# Patient Record
Sex: MALE | Race: WHITE | NOT HISPANIC OR LATINO | URBAN - METROPOLITAN AREA
[De-identification: names, ages, dates, MRNs, and addresses within clinical notes are randomized per-mention and may not be internally consistent; named-entity substitution may affect disease eponyms.]

---

## 2018-01-15 PROBLEM — Z72.0 TOBACCO ABUSE: Status: ACTIVE | Noted: 2018-01-15

## 2021-01-28 ENCOUNTER — LAB VISIT (OUTPATIENT)
Dept: LAB | Facility: HOSPITAL | Age: 56
End: 2021-01-28
Attending: FAMILY MEDICINE
Payer: COMMERCIAL

## 2021-01-28 ENCOUNTER — HOSPITAL ENCOUNTER (OUTPATIENT)
Dept: RADIOLOGY | Facility: HOSPITAL | Age: 56
Discharge: HOME | End: 2021-01-28
Attending: FAMILY MEDICINE
Payer: COMMERCIAL

## 2021-01-28 ENCOUNTER — OFFICE VISIT (OUTPATIENT)
Dept: FAMILY MEDICINE | Facility: CLINIC | Age: 56
End: 2021-01-28
Payer: COMMERCIAL

## 2021-01-28 VITALS
SYSTOLIC BLOOD PRESSURE: 128 MMHG | HEIGHT: 68 IN | OXYGEN SATURATION: 96 % | DIASTOLIC BLOOD PRESSURE: 78 MMHG | HEART RATE: 82 BPM | TEMPERATURE: 98 F | WEIGHT: 157.94 LBS | BODY MASS INDEX: 23.94 KG/M2

## 2021-01-28 DIAGNOSIS — F10.10 ALCOHOL ABUSE: ICD-10-CM

## 2021-01-28 DIAGNOSIS — R41.0 DISORIENTATION: ICD-10-CM

## 2021-01-28 DIAGNOSIS — I10 ESSENTIAL HYPERTENSION: ICD-10-CM

## 2021-01-28 DIAGNOSIS — Z12.5 SCREENING PSA (PROSTATE SPECIFIC ANTIGEN): ICD-10-CM

## 2021-01-28 DIAGNOSIS — Z11.59 ENCOUNTER FOR HEPATITIS C SCREENING TEST FOR LOW RISK PATIENT: ICD-10-CM

## 2021-01-28 DIAGNOSIS — R41.0 DISORIENTATION: Primary | ICD-10-CM

## 2021-01-28 LAB
AMMONIA PLAS-SCNC: 38 UMOL/L (ref 10–50)
BASOPHILS # BLD AUTO: 0.08 K/UL (ref 0–0.2)
BASOPHILS NFR BLD: 0.9 % (ref 0–1.9)
BILIRUB UR QL STRIP: NEGATIVE
CLARITY UR REFRACT.AUTO: CLEAR
COLOR UR AUTO: YELLOW
DIFFERENTIAL METHOD: ABNORMAL
EOSINOPHIL # BLD AUTO: 0.1 K/UL (ref 0–0.5)
EOSINOPHIL NFR BLD: 0.8 % (ref 0–8)
ERYTHROCYTE [DISTWIDTH] IN BLOOD BY AUTOMATED COUNT: 13.6 % (ref 11.5–14.5)
ERYTHROCYTE [SEDIMENTATION RATE] IN BLOOD BY WESTERGREN METHOD: 11 MM/HR (ref 0–23)
GLUCOSE UR QL STRIP: NEGATIVE
HCT VFR BLD AUTO: 54 % (ref 40–54)
HGB BLD-MCNC: 18.3 G/DL (ref 14–18)
HGB UR QL STRIP: ABNORMAL
IMM GRANULOCYTES # BLD AUTO: 0.02 K/UL (ref 0–0.04)
IMM GRANULOCYTES NFR BLD AUTO: 0.2 % (ref 0–0.5)
KETONES UR QL STRIP: NEGATIVE
LEUKOCYTE ESTERASE UR QL STRIP: NEGATIVE
LYMPHOCYTES # BLD AUTO: 3 K/UL (ref 1–4.8)
LYMPHOCYTES NFR BLD: 35.1 % (ref 18–48)
MCH RBC QN AUTO: 32.9 PG (ref 27–31)
MCHC RBC AUTO-ENTMCNC: 33.9 G/DL (ref 32–36)
MCV RBC AUTO: 97 FL (ref 82–98)
MICROSCOPIC COMMENT: NORMAL
MONOCYTES # BLD AUTO: 0.8 K/UL (ref 0.3–1)
MONOCYTES NFR BLD: 9 % (ref 4–15)
NEUTROPHILS # BLD AUTO: 4.6 K/UL (ref 1.8–7.7)
NEUTROPHILS NFR BLD: 54 % (ref 38–73)
NITRITE UR QL STRIP: NEGATIVE
NRBC BLD-RTO: 0 /100 WBC
PH UR STRIP: 6 [PH] (ref 5–8)
PLATELET # BLD AUTO: 286 K/UL (ref 150–350)
PMV BLD AUTO: 10.9 FL (ref 9.2–12.9)
PROT UR QL STRIP: NEGATIVE
RBC # BLD AUTO: 5.56 M/UL (ref 4.6–6.2)
RBC #/AREA URNS AUTO: 0 /HPF (ref 0–4)
SP GR UR STRIP: 1 (ref 1–1.03)
SQUAMOUS #/AREA URNS AUTO: 0 /HPF
URN SPEC COLLECT METH UR: ABNORMAL
WBC # BLD AUTO: 8.52 K/UL (ref 3.9–12.7)
WBC #/AREA URNS AUTO: 0 /HPF (ref 0–5)

## 2021-01-28 PROCEDURE — 71046 XR CHEST PA AND LATERAL: ICD-10-PCS | Mod: 26,,, | Performed by: RADIOLOGY

## 2021-01-28 PROCEDURE — 99205 OFFICE O/P NEW HI 60 MIN: CPT | Mod: S$GLB,,, | Performed by: FAMILY MEDICINE

## 2021-01-28 PROCEDURE — 1126F PR PAIN SEVERITY QUANTIFIED, NO PAIN PRESENT: ICD-10-PCS | Mod: S$GLB,,, | Performed by: FAMILY MEDICINE

## 2021-01-28 PROCEDURE — 3008F PR BODY MASS INDEX (BMI) DOCUMENTED: ICD-10-PCS | Mod: CPTII,S$GLB,, | Performed by: FAMILY MEDICINE

## 2021-01-28 PROCEDURE — 84153 ASSAY OF PSA TOTAL: CPT

## 2021-01-28 PROCEDURE — 80307 DRUG TEST PRSMV CHEM ANLYZR: CPT

## 2021-01-28 PROCEDURE — 83036 HEMOGLOBIN GLYCOSYLATED A1C: CPT

## 2021-01-28 PROCEDURE — 1126F AMNT PAIN NOTED NONE PRSNT: CPT | Mod: S$GLB,,, | Performed by: FAMILY MEDICINE

## 2021-01-28 PROCEDURE — 71046 X-RAY EXAM CHEST 2 VIEWS: CPT | Mod: TC,FY,PO

## 2021-01-28 PROCEDURE — 86140 C-REACTIVE PROTEIN: CPT

## 2021-01-28 PROCEDURE — 3074F PR MOST RECENT SYSTOLIC BLOOD PRESSURE < 130 MM HG: ICD-10-PCS | Mod: CPTII,S$GLB,, | Performed by: FAMILY MEDICINE

## 2021-01-28 PROCEDURE — 3078F DIAST BP <80 MM HG: CPT | Mod: CPTII,S$GLB,, | Performed by: FAMILY MEDICINE

## 2021-01-28 PROCEDURE — 3008F BODY MASS INDEX DOCD: CPT | Mod: CPTII,S$GLB,, | Performed by: FAMILY MEDICINE

## 2021-01-28 PROCEDURE — 3078F PR MOST RECENT DIASTOLIC BLOOD PRESSURE < 80 MM HG: ICD-10-PCS | Mod: CPTII,S$GLB,, | Performed by: FAMILY MEDICINE

## 2021-01-28 PROCEDURE — 84425 ASSAY OF VITAMIN B-1: CPT

## 2021-01-28 PROCEDURE — 99999 PR PBB SHADOW E&M-NEW PATIENT-LVL IV: ICD-10-PCS | Mod: PBBFAC,,, | Performed by: FAMILY MEDICINE

## 2021-01-28 PROCEDURE — 71046 X-RAY EXAM CHEST 2 VIEWS: CPT | Mod: 26,,, | Performed by: RADIOLOGY

## 2021-01-28 PROCEDURE — 82607 VITAMIN B-12: CPT

## 2021-01-28 PROCEDURE — 82140 ASSAY OF AMMONIA: CPT

## 2021-01-28 PROCEDURE — 3074F SYST BP LT 130 MM HG: CPT | Mod: CPTII,S$GLB,, | Performed by: FAMILY MEDICINE

## 2021-01-28 PROCEDURE — 85652 RBC SED RATE AUTOMATED: CPT

## 2021-01-28 PROCEDURE — 81001 URINALYSIS AUTO W/SCOPE: CPT

## 2021-01-28 PROCEDURE — 36415 COLL VENOUS BLD VENIPUNCTURE: CPT | Mod: PO

## 2021-01-28 PROCEDURE — 99999 PR PBB SHADOW E&M-NEW PATIENT-LVL IV: CPT | Mod: PBBFAC,,, | Performed by: FAMILY MEDICINE

## 2021-01-28 PROCEDURE — 86803 HEPATITIS C AB TEST: CPT

## 2021-01-28 PROCEDURE — 82746 ASSAY OF FOLIC ACID SERUM: CPT

## 2021-01-28 PROCEDURE — 99205 PR OFFICE/OUTPT VISIT, NEW, LEVL V, 60-74 MIN: ICD-10-PCS | Mod: S$GLB,,, | Performed by: FAMILY MEDICINE

## 2021-01-28 PROCEDURE — 85025 COMPLETE CBC W/AUTO DIFF WBC: CPT

## 2021-01-28 PROCEDURE — 80061 LIPID PANEL: CPT

## 2021-01-28 PROCEDURE — 84443 ASSAY THYROID STIM HORMONE: CPT

## 2021-01-28 PROCEDURE — 80053 COMPREHEN METABOLIC PANEL: CPT

## 2021-01-28 RX ORDER — LANOLIN ALCOHOL/MO/W.PET/CERES
100 CREAM (GRAM) TOPICAL DAILY
Qty: 30 TABLET | Refills: 1 | Status: SHIPPED | OUTPATIENT
Start: 2021-01-28 | End: 2021-02-02

## 2021-01-29 ENCOUNTER — TELEPHONE (OUTPATIENT)
Dept: FAMILY MEDICINE | Facility: CLINIC | Age: 56
End: 2021-01-29

## 2021-01-29 ENCOUNTER — TELEPHONE (OUTPATIENT)
Dept: PAIN MEDICINE | Facility: CLINIC | Age: 56
End: 2021-01-29

## 2021-01-29 ENCOUNTER — HOSPITAL ENCOUNTER (OUTPATIENT)
Dept: RADIOLOGY | Facility: HOSPITAL | Age: 56
Discharge: HOME | End: 2021-01-29
Attending: FAMILY MEDICINE
Payer: COMMERCIAL

## 2021-01-29 DIAGNOSIS — R41.0 DISORIENTATION: ICD-10-CM

## 2021-01-29 LAB
ALBUMIN SERPL BCP-MCNC: 4 G/DL (ref 3.5–5.2)
ALP SERPL-CCNC: 66 U/L (ref 55–135)
ALT SERPL W/O P-5'-P-CCNC: 12 U/L (ref 10–44)
AMPHET+METHAMPHET UR QL: NEGATIVE
ANION GAP SERPL CALC-SCNC: 9 MMOL/L (ref 8–16)
AST SERPL-CCNC: 17 U/L (ref 10–40)
BARBITURATES UR QL SCN>200 NG/ML: NEGATIVE
BENZODIAZ UR QL SCN>200 NG/ML: NEGATIVE
BILIRUB SERPL-MCNC: 0.6 MG/DL (ref 0.1–1)
BUN SERPL-MCNC: 4 MG/DL (ref 6–20)
BZE UR QL SCN: NEGATIVE
CALCIUM SERPL-MCNC: 9.3 MG/DL (ref 8.7–10.5)
CANNABINOIDS UR QL SCN: NEGATIVE
CHLORIDE SERPL-SCNC: 100 MMOL/L (ref 95–110)
CHOLEST SERPL-MCNC: 140 MG/DL (ref 120–199)
CHOLEST/HDLC SERPL: 2.3 {RATIO} (ref 2–5)
CO2 SERPL-SCNC: 28 MMOL/L (ref 23–29)
COMPLEXED PSA SERPL-MCNC: 0.22 NG/ML (ref 0–4)
CREAT SERPL-MCNC: 1 MG/DL (ref 0.5–1.4)
CREAT UR-MCNC: 62 MG/DL (ref 23–375)
CRP SERPL-MCNC: 1 MG/L (ref 0–8.2)
EST. GFR  (AFRICAN AMERICAN): >60 ML/MIN/1.73 M^2
EST. GFR  (NON AFRICAN AMERICAN): >60 ML/MIN/1.73 M^2
ESTIMATED AVG GLUCOSE: 105 MG/DL (ref 68–131)
ETHANOL UR-MCNC: <10 MG/DL
FOLATE SERPL-MCNC: 3.6 NG/ML (ref 4–24)
GLUCOSE SERPL-MCNC: 79 MG/DL (ref 70–110)
HBA1C MFR BLD: 5.3 % (ref 4–5.6)
HCV AB SERPL QL IA: NEGATIVE
HDLC SERPL-MCNC: 60 MG/DL (ref 40–75)
HDLC SERPL: 42.9 % (ref 20–50)
LDLC SERPL CALC-MCNC: 52.6 MG/DL (ref 63–159)
METHADONE UR QL SCN>300 NG/ML: NEGATIVE
NONHDLC SERPL-MCNC: 80 MG/DL
OPIATES UR QL SCN: NEGATIVE
PCP UR QL SCN>25 NG/ML: NEGATIVE
POTASSIUM SERPL-SCNC: 3.7 MMOL/L (ref 3.5–5.1)
PROT SERPL-MCNC: 7.2 G/DL (ref 6–8.4)
SODIUM SERPL-SCNC: 137 MMOL/L (ref 136–145)
TOXICOLOGY INFORMATION: NORMAL
TRIGL SERPL-MCNC: 137 MG/DL (ref 30–150)
TSH SERPL DL<=0.005 MIU/L-ACNC: 1.18 UIU/ML (ref 0.4–4)
VIT B12 SERPL-MCNC: 202 PG/ML (ref 210–950)

## 2021-01-29 PROCEDURE — 70450 CT HEAD WITHOUT CONTRAST: ICD-10-PCS | Mod: 26,,, | Performed by: RADIOLOGY

## 2021-01-29 PROCEDURE — 70450 CT HEAD/BRAIN W/O DYE: CPT | Mod: TC,PO

## 2021-01-29 PROCEDURE — 70450 CT HEAD/BRAIN W/O DYE: CPT | Mod: 26,,, | Performed by: RADIOLOGY

## 2021-02-01 ENCOUNTER — TELEPHONE (OUTPATIENT)
Dept: FAMILY MEDICINE | Facility: CLINIC | Age: 56
End: 2021-02-01

## 2021-02-01 ENCOUNTER — OFFICE VISIT (OUTPATIENT)
Dept: FAMILY MEDICINE | Facility: CLINIC | Age: 56
End: 2021-02-01
Payer: COMMERCIAL

## 2021-02-01 VITALS
BODY MASS INDEX: 23.86 KG/M2 | SYSTOLIC BLOOD PRESSURE: 138 MMHG | DIASTOLIC BLOOD PRESSURE: 86 MMHG | TEMPERATURE: 97 F | OXYGEN SATURATION: 98 % | WEIGHT: 157.44 LBS | HEART RATE: 84 BPM | HEIGHT: 68 IN

## 2021-02-01 DIAGNOSIS — I63.89 ACUTE ARTERIAL ISCHEMIC STROKE, MULTIFOCAL, MULT VASCULAR TERRITORIES: ICD-10-CM

## 2021-02-01 DIAGNOSIS — I63.89 CEREBROVASCULAR ACCIDENT (CVA) DUE TO OTHER MECHANISM: ICD-10-CM

## 2021-02-01 DIAGNOSIS — R90.89 ABNORMAL CT OF BRAIN: Primary | ICD-10-CM

## 2021-02-01 DIAGNOSIS — F10.20 ALCOHOLIC: ICD-10-CM

## 2021-02-01 DIAGNOSIS — E53.8 FOLIC ACID DEFICIENCY: ICD-10-CM

## 2021-02-01 DIAGNOSIS — E53.8 VITAMIN B 12 DEFICIENCY: ICD-10-CM

## 2021-02-01 PROCEDURE — 99214 PR OFFICE/OUTPT VISIT, EST, LEVL IV, 30-39 MIN: ICD-10-PCS | Mod: 25,S$GLB,, | Performed by: FAMILY MEDICINE

## 2021-02-01 PROCEDURE — 3008F BODY MASS INDEX DOCD: CPT | Mod: CPTII,S$GLB,, | Performed by: FAMILY MEDICINE

## 2021-02-01 PROCEDURE — 1126F PR PAIN SEVERITY QUANTIFIED, NO PAIN PRESENT: ICD-10-PCS | Mod: S$GLB,,, | Performed by: FAMILY MEDICINE

## 2021-02-01 PROCEDURE — 3075F PR MOST RECENT SYSTOLIC BLOOD PRESS GE 130-139MM HG: ICD-10-PCS | Mod: CPTII,S$GLB,, | Performed by: FAMILY MEDICINE

## 2021-02-01 PROCEDURE — 96372 THER/PROPH/DIAG INJ SC/IM: CPT | Mod: S$GLB,,, | Performed by: FAMILY MEDICINE

## 2021-02-01 PROCEDURE — 3075F SYST BP GE 130 - 139MM HG: CPT | Mod: CPTII,S$GLB,, | Performed by: FAMILY MEDICINE

## 2021-02-01 PROCEDURE — 3079F PR MOST RECENT DIASTOLIC BLOOD PRESSURE 80-89 MM HG: ICD-10-PCS | Mod: CPTII,S$GLB,, | Performed by: FAMILY MEDICINE

## 2021-02-01 PROCEDURE — 96372 PR INJECTION,THERAP/PROPH/DIAG2ST, IM OR SUBCUT: ICD-10-PCS | Mod: S$GLB,,, | Performed by: FAMILY MEDICINE

## 2021-02-01 PROCEDURE — 3079F DIAST BP 80-89 MM HG: CPT | Mod: CPTII,S$GLB,, | Performed by: FAMILY MEDICINE

## 2021-02-01 PROCEDURE — 3008F PR BODY MASS INDEX (BMI) DOCUMENTED: ICD-10-PCS | Mod: CPTII,S$GLB,, | Performed by: FAMILY MEDICINE

## 2021-02-01 PROCEDURE — 99999 PR PBB SHADOW E&M-EST. PATIENT-LVL V: ICD-10-PCS | Mod: PBBFAC,,, | Performed by: FAMILY MEDICINE

## 2021-02-01 PROCEDURE — 99999 PR PBB SHADOW E&M-EST. PATIENT-LVL V: CPT | Mod: PBBFAC,,, | Performed by: FAMILY MEDICINE

## 2021-02-01 PROCEDURE — 1126F AMNT PAIN NOTED NONE PRSNT: CPT | Mod: S$GLB,,, | Performed by: FAMILY MEDICINE

## 2021-02-01 PROCEDURE — 99214 OFFICE O/P EST MOD 30 MIN: CPT | Mod: 25,S$GLB,, | Performed by: FAMILY MEDICINE

## 2021-02-01 RX ORDER — FOLIC ACID 1 MG/1
1 TABLET ORAL DAILY
Qty: 30 TABLET | Refills: 2 | Status: SHIPPED | OUTPATIENT
Start: 2021-02-01 | End: 2021-02-02

## 2021-02-01 RX ORDER — CYANOCOBALAMIN 1000 UG/ML
1000 INJECTION, SOLUTION INTRAMUSCULAR; SUBCUTANEOUS
Status: ACTIVE | OUTPATIENT
Start: 2021-02-01 | End: 2022-01-27

## 2021-02-01 RX ORDER — ASPIRIN 81 MG/1
81 TABLET ORAL DAILY
Qty: 30 TABLET | Refills: 3 | Status: SHIPPED | OUTPATIENT
Start: 2021-02-01 | End: 2023-09-01

## 2021-02-01 RX ADMIN — CYANOCOBALAMIN 1000 MCG: 1000 INJECTION, SOLUTION INTRAMUSCULAR; SUBCUTANEOUS at 11:02

## 2021-02-02 ENCOUNTER — TELEPHONE (OUTPATIENT)
Dept: SLEEP MEDICINE | Facility: HOSPITAL | Age: 56
End: 2021-02-02

## 2021-02-02 PROBLEM — F10.10 ALCOHOL ABUSE: Status: ACTIVE | Noted: 2021-02-02

## 2021-02-02 PROBLEM — I63.9 CVA (CEREBRAL VASCULAR ACCIDENT): Status: ACTIVE | Noted: 2021-02-02

## 2021-02-02 PROBLEM — F10.20 ALCOHOLISM: Status: ACTIVE | Noted: 2021-02-02

## 2021-02-02 PROBLEM — D75.1 POLYCYTHEMIA: Status: ACTIVE | Noted: 2021-02-02

## 2021-02-02 PROBLEM — I63.9 CEREBROVASCULAR ACCIDENT (CVA): Status: ACTIVE | Noted: 2021-02-02

## 2021-02-03 PROBLEM — E53.8 B12 DEFICIENCY: Status: ACTIVE | Noted: 2021-02-03

## 2021-02-03 PROBLEM — I63.511 ACUTE ISCHEMIC RIGHT MCA STROKE: Status: ACTIVE | Noted: 2021-02-02

## 2021-02-03 PROBLEM — E87.1 HYPONATREMIA: Status: ACTIVE | Noted: 2021-02-03

## 2021-02-05 LAB — VIT B1 BLD-MCNC: 55 UG/L (ref 38–122)

## 2021-03-05 ENCOUNTER — TELEPHONE (OUTPATIENT)
Dept: NEUROLOGY | Facility: CLINIC | Age: 56
End: 2021-03-05

## 2021-03-05 ENCOUNTER — OFFICE VISIT (OUTPATIENT)
Dept: NEUROLOGY | Facility: CLINIC | Age: 56
End: 2021-03-05
Payer: COMMERCIAL

## 2021-03-05 ENCOUNTER — PATIENT MESSAGE (OUTPATIENT)
Dept: NEUROLOGY | Facility: CLINIC | Age: 56
End: 2021-03-05

## 2021-03-05 VITALS
BODY MASS INDEX: 23.33 KG/M2 | SYSTOLIC BLOOD PRESSURE: 112 MMHG | RESPIRATION RATE: 16 BRPM | TEMPERATURE: 98 F | DIASTOLIC BLOOD PRESSURE: 63 MMHG | WEIGHT: 153.44 LBS | HEART RATE: 63 BPM

## 2021-03-05 DIAGNOSIS — E53.8 B12 DEFICIENCY: ICD-10-CM

## 2021-03-05 DIAGNOSIS — I10 ESSENTIAL HYPERTENSION: ICD-10-CM

## 2021-03-05 DIAGNOSIS — H53.40 VISUAL FIELD CUT: ICD-10-CM

## 2021-03-05 DIAGNOSIS — I63.511 ACUTE ISCHEMIC RIGHT MCA STROKE: ICD-10-CM

## 2021-03-05 DIAGNOSIS — I63.511 CEREBROVASCULAR ACCIDENT (CVA) DUE TO OCCLUSION OF RIGHT MIDDLE CEREBRAL ARTERY: Primary | ICD-10-CM

## 2021-03-05 DIAGNOSIS — F10.20 ALCOHOLISM: ICD-10-CM

## 2021-03-05 DIAGNOSIS — Z72.0 TOBACCO ABUSE: ICD-10-CM

## 2021-03-05 DIAGNOSIS — F32.A DEPRESSION, UNSPECIFIED DEPRESSION TYPE: ICD-10-CM

## 2021-03-05 DIAGNOSIS — D75.1 POLYCYTHEMIA: ICD-10-CM

## 2021-03-05 PROCEDURE — 3074F PR MOST RECENT SYSTOLIC BLOOD PRESSURE < 130 MM HG: ICD-10-PCS | Mod: CPTII,S$GLB,, | Performed by: NURSE PRACTITIONER

## 2021-03-05 PROCEDURE — 99417 PR PROLONGED SVC, OUTPT, W/WO DIRECT PT CONTACT,  EA ADDTL 15 MIN: ICD-10-PCS | Mod: S$GLB,,, | Performed by: NURSE PRACTITIONER

## 2021-03-05 PROCEDURE — 3008F PR BODY MASS INDEX (BMI) DOCUMENTED: ICD-10-PCS | Mod: CPTII,S$GLB,, | Performed by: NURSE PRACTITIONER

## 2021-03-05 PROCEDURE — 99999 PR PBB SHADOW E&M-EST. PATIENT-LVL V: ICD-10-PCS | Mod: PBBFAC,,, | Performed by: NURSE PRACTITIONER

## 2021-03-05 PROCEDURE — 3074F SYST BP LT 130 MM HG: CPT | Mod: CPTII,S$GLB,, | Performed by: NURSE PRACTITIONER

## 2021-03-05 PROCEDURE — 1126F PR PAIN SEVERITY QUANTIFIED, NO PAIN PRESENT: ICD-10-PCS | Mod: S$GLB,,, | Performed by: NURSE PRACTITIONER

## 2021-03-05 PROCEDURE — 99417 PROLNG OP E/M EACH 15 MIN: CPT | Mod: S$GLB,,, | Performed by: NURSE PRACTITIONER

## 2021-03-05 PROCEDURE — 3078F DIAST BP <80 MM HG: CPT | Mod: CPTII,S$GLB,, | Performed by: NURSE PRACTITIONER

## 2021-03-05 PROCEDURE — 3078F PR MOST RECENT DIASTOLIC BLOOD PRESSURE < 80 MM HG: ICD-10-PCS | Mod: CPTII,S$GLB,, | Performed by: NURSE PRACTITIONER

## 2021-03-05 PROCEDURE — 1126F AMNT PAIN NOTED NONE PRSNT: CPT | Mod: S$GLB,,, | Performed by: NURSE PRACTITIONER

## 2021-03-05 PROCEDURE — 99215 OFFICE O/P EST HI 40 MIN: CPT | Mod: S$GLB,,, | Performed by: NURSE PRACTITIONER

## 2021-03-05 PROCEDURE — 99215 PR OFFICE/OUTPT VISIT, EST, LEVL V, 40-54 MIN: ICD-10-PCS | Mod: S$GLB,,, | Performed by: NURSE PRACTITIONER

## 2021-03-05 PROCEDURE — 99999 PR PBB SHADOW E&M-EST. PATIENT-LVL V: CPT | Mod: PBBFAC,,, | Performed by: NURSE PRACTITIONER

## 2021-03-05 PROCEDURE — 3008F BODY MASS INDEX DOCD: CPT | Mod: CPTII,S$GLB,, | Performed by: NURSE PRACTITIONER

## 2021-03-09 ENCOUNTER — LAB VISIT (OUTPATIENT)
Dept: LAB | Facility: HOSPITAL | Age: 56
End: 2021-03-09
Attending: NURSE PRACTITIONER
Payer: COMMERCIAL

## 2021-03-09 DIAGNOSIS — I63.511 CEREBROVASCULAR ACCIDENT (CVA) DUE TO OCCLUSION OF RIGHT MIDDLE CEREBRAL ARTERY: ICD-10-CM

## 2021-03-09 PROCEDURE — 83695 ASSAY OF LIPOPROTEIN(A): CPT | Performed by: NURSE PRACTITIONER

## 2021-03-09 PROCEDURE — 83021 HEMOGLOBIN CHROMOTOGRAPHY: CPT | Performed by: NURSE PRACTITIONER

## 2021-03-09 PROCEDURE — 85613 RUSSELL VIPER VENOM DILUTED: CPT | Performed by: NURSE PRACTITIONER

## 2021-03-09 PROCEDURE — 86146 BETA-2 GLYCOPROTEIN ANTIBODY: CPT | Mod: 59 | Performed by: NURSE PRACTITIONER

## 2021-03-09 PROCEDURE — 85303 CLOT INHIBIT PROT C ACTIVITY: CPT | Performed by: NURSE PRACTITIONER

## 2021-03-09 PROCEDURE — 85240 CLOT FACTOR VIII AHG 1 STAGE: CPT | Performed by: NURSE PRACTITIONER

## 2021-03-09 PROCEDURE — 85300 ANTITHROMBIN III ACTIVITY: CPT | Performed by: NURSE PRACTITIONER

## 2021-03-09 PROCEDURE — 86147 CARDIOLIPIN ANTIBODY EA IG: CPT | Performed by: NURSE PRACTITIONER

## 2021-03-09 PROCEDURE — 83090 ASSAY OF HOMOCYSTEINE: CPT | Performed by: NURSE PRACTITIONER

## 2021-03-09 PROCEDURE — 36415 COLL VENOUS BLD VENIPUNCTURE: CPT | Mod: PO | Performed by: NURSE PRACTITIONER

## 2021-03-09 PROCEDURE — 86140 C-REACTIVE PROTEIN: CPT | Performed by: NURSE PRACTITIONER

## 2021-03-09 PROCEDURE — 85305 CLOT INHIBIT PROT S TOTAL: CPT | Performed by: NURSE PRACTITIONER

## 2021-03-10 ENCOUNTER — TELEPHONE (OUTPATIENT)
Dept: OPHTHALMOLOGY | Facility: CLINIC | Age: 56
End: 2021-03-10

## 2021-03-10 LAB
CRP SERPL-MCNC: 1.3 MG/L (ref 0–8.2)
FACT VIII ACT/NOR PPP: 76 % (ref 60–170)
HCYS SERPL-SCNC: 7.8 UMOL/L (ref 4–16.5)
HGB A2 MFR BLD HPLC: 2.8 % (ref 2.2–3.2)
HGB FRACT BLD ELPH-IMP: NORMAL
HGB FRACT BLD ELPH-IMP: NORMAL

## 2021-03-11 LAB
AT III ACT/NOR PPP CHRO: 103 % (ref 83–118)
LA PPP-IMP: NEGATIVE

## 2021-03-12 LAB
APTT PROTEIN C ACTIVATOR+FV DP/APTT PPP: 112 % (ref 70–140)
CARDIOLIPIN IGG SER IA-ACNC: <9.4 GPL (ref 0–14.99)
CARDIOLIPIN IGM SER IA-ACNC: <9.4 MPL (ref 0–12.49)
LPA SERPL-MCNC: 71 MG/DL (ref 0–30)
PROT S ACT/NOR PPP: 116 % (ref 70–140)

## 2021-03-13 LAB
B2 GLYCOPROT1 IGA SER QL: <9 SAU
B2 GLYCOPROT1 IGG SER QL: <9 SGU
B2 GLYCOPROT1 IGM SER QL: <9 SMU

## 2021-03-17 ENCOUNTER — TELEPHONE (OUTPATIENT)
Dept: NEUROLOGY | Facility: CLINIC | Age: 56
End: 2021-03-17

## 2021-03-18 PROBLEM — E78.2 MIXED HYPERLIPIDEMIA: Status: ACTIVE | Noted: 2021-03-18

## 2021-03-19 ENCOUNTER — TELEPHONE (OUTPATIENT)
Dept: NEUROLOGY | Facility: CLINIC | Age: 56
End: 2021-03-19

## 2021-03-19 ENCOUNTER — OFFICE VISIT (OUTPATIENT)
Dept: CARDIOLOGY | Facility: CLINIC | Age: 56
End: 2021-03-19
Payer: COMMERCIAL

## 2021-03-19 VITALS
HEART RATE: 62 BPM | BODY MASS INDEX: 22.96 KG/M2 | HEIGHT: 69 IN | DIASTOLIC BLOOD PRESSURE: 85 MMHG | SYSTOLIC BLOOD PRESSURE: 140 MMHG | WEIGHT: 155 LBS

## 2021-03-19 DIAGNOSIS — I63.511 ACUTE ISCHEMIC RIGHT MCA STROKE: ICD-10-CM

## 2021-03-19 DIAGNOSIS — I10 ESSENTIAL HYPERTENSION: Primary | ICD-10-CM

## 2021-03-19 DIAGNOSIS — I63.511 CEREBROVASCULAR ACCIDENT (CVA) DUE TO OCCLUSION OF RIGHT MIDDLE CEREBRAL ARTERY: ICD-10-CM

## 2021-03-19 DIAGNOSIS — E78.2 MIXED HYPERLIPIDEMIA: ICD-10-CM

## 2021-03-19 PROCEDURE — 99204 PR OFFICE/OUTPT VISIT, NEW, LEVL IV, 45-59 MIN: ICD-10-PCS | Mod: S$GLB,,, | Performed by: INTERNAL MEDICINE

## 2021-03-19 PROCEDURE — 99204 OFFICE O/P NEW MOD 45 MIN: CPT | Mod: S$GLB,,, | Performed by: INTERNAL MEDICINE

## 2021-03-19 PROCEDURE — 1126F PR PAIN SEVERITY QUANTIFIED, NO PAIN PRESENT: ICD-10-PCS | Mod: S$GLB,,, | Performed by: INTERNAL MEDICINE

## 2021-03-19 PROCEDURE — 3079F DIAST BP 80-89 MM HG: CPT | Mod: CPTII,S$GLB,, | Performed by: INTERNAL MEDICINE

## 2021-03-19 PROCEDURE — 99999 PR PBB SHADOW E&M-EST. PATIENT-LVL III: ICD-10-PCS | Mod: PBBFAC,,, | Performed by: INTERNAL MEDICINE

## 2021-03-19 PROCEDURE — 3008F PR BODY MASS INDEX (BMI) DOCUMENTED: ICD-10-PCS | Mod: CPTII,S$GLB,, | Performed by: INTERNAL MEDICINE

## 2021-03-19 PROCEDURE — 99999 PR PBB SHADOW E&M-EST. PATIENT-LVL III: CPT | Mod: PBBFAC,,, | Performed by: INTERNAL MEDICINE

## 2021-03-19 PROCEDURE — 3079F PR MOST RECENT DIASTOLIC BLOOD PRESSURE 80-89 MM HG: ICD-10-PCS | Mod: CPTII,S$GLB,, | Performed by: INTERNAL MEDICINE

## 2021-03-19 PROCEDURE — 3077F PR MOST RECENT SYSTOLIC BLOOD PRESSURE >= 140 MM HG: ICD-10-PCS | Mod: CPTII,S$GLB,, | Performed by: INTERNAL MEDICINE

## 2021-03-19 PROCEDURE — 1126F AMNT PAIN NOTED NONE PRSNT: CPT | Mod: S$GLB,,, | Performed by: INTERNAL MEDICINE

## 2021-03-19 PROCEDURE — 3077F SYST BP >= 140 MM HG: CPT | Mod: CPTII,S$GLB,, | Performed by: INTERNAL MEDICINE

## 2021-03-19 PROCEDURE — 3008F BODY MASS INDEX DOCD: CPT | Mod: CPTII,S$GLB,, | Performed by: INTERNAL MEDICINE

## 2021-03-19 RX ORDER — ATORVASTATIN CALCIUM 40 MG/1
40 TABLET, FILM COATED ORAL NIGHTLY
Qty: 90 TABLET | Refills: 3 | Status: SHIPPED | OUTPATIENT
Start: 2021-03-19 | End: 2021-08-19

## 2021-03-19 RX ORDER — HYDROXYZINE HYDROCHLORIDE 25 MG/1
25 TABLET, FILM COATED ORAL EVERY 4 HOURS PRN
COMMUNITY
End: 2021-05-11 | Stop reason: SDUPTHER

## 2021-03-24 ENCOUNTER — OFFICE VISIT (OUTPATIENT)
Dept: NEUROLOGY | Facility: CLINIC | Age: 56
End: 2021-03-24
Payer: COMMERCIAL

## 2021-03-24 VITALS
DIASTOLIC BLOOD PRESSURE: 64 MMHG | SYSTOLIC BLOOD PRESSURE: 110 MMHG | HEART RATE: 86 BPM | RESPIRATION RATE: 16 BRPM | BODY MASS INDEX: 22.35 KG/M2 | WEIGHT: 151.38 LBS | TEMPERATURE: 98 F

## 2021-03-24 DIAGNOSIS — E78.2 MIXED HYPERLIPIDEMIA: ICD-10-CM

## 2021-03-24 DIAGNOSIS — I63.89 OTHER CEREBRAL INFARCTION: ICD-10-CM

## 2021-03-24 DIAGNOSIS — F32.9 REACTIVE DEPRESSION: ICD-10-CM

## 2021-03-24 DIAGNOSIS — Z91.89 AT RISK FOR SEIZURES: Primary | ICD-10-CM

## 2021-03-24 DIAGNOSIS — I63.511 ACUTE ISCHEMIC RIGHT MCA STROKE: ICD-10-CM

## 2021-03-24 DIAGNOSIS — F10.20 ALCOHOLISM: ICD-10-CM

## 2021-03-24 DIAGNOSIS — I10 ESSENTIAL HYPERTENSION: ICD-10-CM

## 2021-03-24 PROBLEM — I63.9 CVA (CEREBRAL VASCULAR ACCIDENT): Status: RESOLVED | Noted: 2021-02-02 | Resolved: 2021-03-24

## 2021-03-24 PROCEDURE — 3008F PR BODY MASS INDEX (BMI) DOCUMENTED: ICD-10-PCS | Mod: CPTII,S$GLB,, | Performed by: NURSE PRACTITIONER

## 2021-03-24 PROCEDURE — 1126F PR PAIN SEVERITY QUANTIFIED, NO PAIN PRESENT: ICD-10-PCS | Mod: S$GLB,,, | Performed by: NURSE PRACTITIONER

## 2021-03-24 PROCEDURE — 3074F SYST BP LT 130 MM HG: CPT | Mod: CPTII,S$GLB,, | Performed by: NURSE PRACTITIONER

## 2021-03-24 PROCEDURE — 99215 OFFICE O/P EST HI 40 MIN: CPT | Mod: S$GLB,,, | Performed by: NURSE PRACTITIONER

## 2021-03-24 PROCEDURE — 3078F DIAST BP <80 MM HG: CPT | Mod: CPTII,S$GLB,, | Performed by: NURSE PRACTITIONER

## 2021-03-24 PROCEDURE — 99215 PR OFFICE/OUTPT VISIT, EST, LEVL V, 40-54 MIN: ICD-10-PCS | Mod: S$GLB,,, | Performed by: NURSE PRACTITIONER

## 2021-03-24 PROCEDURE — 3078F PR MOST RECENT DIASTOLIC BLOOD PRESSURE < 80 MM HG: ICD-10-PCS | Mod: CPTII,S$GLB,, | Performed by: NURSE PRACTITIONER

## 2021-03-24 PROCEDURE — 99999 PR PBB SHADOW E&M-EST. PATIENT-LVL V: CPT | Mod: PBBFAC,,, | Performed by: NURSE PRACTITIONER

## 2021-03-24 PROCEDURE — 3074F PR MOST RECENT SYSTOLIC BLOOD PRESSURE < 130 MM HG: ICD-10-PCS | Mod: CPTII,S$GLB,, | Performed by: NURSE PRACTITIONER

## 2021-03-24 PROCEDURE — 99999 PR PBB SHADOW E&M-EST. PATIENT-LVL V: ICD-10-PCS | Mod: PBBFAC,,, | Performed by: NURSE PRACTITIONER

## 2021-03-24 PROCEDURE — 1126F AMNT PAIN NOTED NONE PRSNT: CPT | Mod: S$GLB,,, | Performed by: NURSE PRACTITIONER

## 2021-03-24 PROCEDURE — 3008F BODY MASS INDEX DOCD: CPT | Mod: CPTII,S$GLB,, | Performed by: NURSE PRACTITIONER

## 2021-03-25 ENCOUNTER — TELEPHONE (OUTPATIENT)
Dept: NEUROLOGY | Facility: CLINIC | Age: 56
End: 2021-03-25

## 2021-03-25 DIAGNOSIS — I63.89 OTHER CEREBRAL INFARCTION: ICD-10-CM

## 2021-03-30 ENCOUNTER — LAB VISIT (OUTPATIENT)
Dept: LAB | Facility: HOSPITAL | Age: 56
End: 2021-03-30
Attending: NURSE PRACTITIONER
Payer: COMMERCIAL

## 2021-03-30 ENCOUNTER — TELEPHONE (OUTPATIENT)
Dept: NEUROLOGY | Facility: CLINIC | Age: 56
End: 2021-03-30

## 2021-03-30 DIAGNOSIS — I63.89 OTHER CEREBRAL INFARCTION: ICD-10-CM

## 2021-03-30 PROCEDURE — 85652 RBC SED RATE AUTOMATED: CPT | Performed by: NURSE PRACTITIONER

## 2021-03-30 PROCEDURE — 86140 C-REACTIVE PROTEIN: CPT | Performed by: NURSE PRACTITIONER

## 2021-03-30 PROCEDURE — 82565 ASSAY OF CREATININE: CPT | Performed by: NURSE PRACTITIONER

## 2021-03-30 PROCEDURE — 36415 COLL VENOUS BLD VENIPUNCTURE: CPT | Mod: PO | Performed by: NURSE PRACTITIONER

## 2021-03-31 LAB
CREAT SERPL-MCNC: 0.9 MG/DL (ref 0.5–1.4)
CRP SERPL-MCNC: 7.9 MG/L (ref 0–8.2)
ERYTHROCYTE [SEDIMENTATION RATE] IN BLOOD BY WESTERGREN METHOD: 11 MM/HR (ref 0–23)
EST. GFR  (AFRICAN AMERICAN): >60 ML/MIN/1.73 M^2
EST. GFR  (NON AFRICAN AMERICAN): >60 ML/MIN/1.73 M^2

## 2021-04-01 ENCOUNTER — TELEPHONE (OUTPATIENT)
Dept: NEUROLOGY | Facility: CLINIC | Age: 56
End: 2021-04-01

## 2021-04-01 ENCOUNTER — OFFICE VISIT (OUTPATIENT)
Dept: OPHTHALMOLOGY | Facility: CLINIC | Age: 56
End: 2021-04-01
Payer: COMMERCIAL

## 2021-04-01 ENCOUNTER — CLINICAL SUPPORT (OUTPATIENT)
Dept: OPHTHALMOLOGY | Facility: CLINIC | Age: 56
End: 2021-04-01
Payer: COMMERCIAL

## 2021-04-01 DIAGNOSIS — H53.40 VISUAL FIELD CUT: ICD-10-CM

## 2021-04-01 DIAGNOSIS — H53.461 HOMONYMOUS HEMIANOPIA, RIGHT: Primary | ICD-10-CM

## 2021-04-01 PROCEDURE — 92004 PR EYE EXAM, NEW PATIENT,COMPREHESV: ICD-10-PCS | Mod: S$GLB,,, | Performed by: OPHTHALMOLOGY

## 2021-04-01 PROCEDURE — 1126F AMNT PAIN NOTED NONE PRSNT: CPT | Mod: S$GLB,,, | Performed by: OPHTHALMOLOGY

## 2021-04-01 PROCEDURE — 99999 PR PBB SHADOW E&M-EST. PATIENT-LVL III: ICD-10-PCS | Mod: PBBFAC,,, | Performed by: OPHTHALMOLOGY

## 2021-04-01 PROCEDURE — 1126F PR PAIN SEVERITY QUANTIFIED, NO PAIN PRESENT: ICD-10-PCS | Mod: S$GLB,,, | Performed by: OPHTHALMOLOGY

## 2021-04-01 PROCEDURE — 92083 HUMPHREY VISUAL FIELD - OU - BOTH EYES: ICD-10-PCS | Mod: S$GLB,,, | Performed by: OPHTHALMOLOGY

## 2021-04-01 PROCEDURE — 92083 EXTENDED VISUAL FIELD XM: CPT | Mod: S$GLB,,, | Performed by: OPHTHALMOLOGY

## 2021-04-01 PROCEDURE — 92004 COMPRE OPH EXAM NEW PT 1/>: CPT | Mod: S$GLB,,, | Performed by: OPHTHALMOLOGY

## 2021-04-01 PROCEDURE — 99999 PR PBB SHADOW E&M-EST. PATIENT-LVL III: CPT | Mod: PBBFAC,,, | Performed by: OPHTHALMOLOGY

## 2021-04-05 ENCOUNTER — TELEPHONE (OUTPATIENT)
Dept: NEUROLOGY | Facility: CLINIC | Age: 56
End: 2021-04-05

## 2021-04-07 ENCOUNTER — TELEPHONE (OUTPATIENT)
Dept: NEUROLOGY | Facility: CLINIC | Age: 56
End: 2021-04-07

## 2021-04-14 ENCOUNTER — TELEPHONE (OUTPATIENT)
Dept: NEUROLOGY | Facility: CLINIC | Age: 56
End: 2021-04-14

## 2021-04-15 ENCOUNTER — TELEPHONE (OUTPATIENT)
Dept: NEUROLOGY | Facility: CLINIC | Age: 56
End: 2021-04-15

## 2021-05-06 ENCOUNTER — PATIENT MESSAGE (OUTPATIENT)
Dept: RESEARCH | Facility: HOSPITAL | Age: 56
End: 2021-05-06

## 2021-05-27 ENCOUNTER — TELEPHONE (OUTPATIENT)
Dept: PSYCHIATRY | Facility: CLINIC | Age: 56
End: 2021-05-27

## 2021-08-16 ENCOUNTER — OFFICE VISIT (OUTPATIENT)
Dept: NEUROLOGY | Facility: CLINIC | Age: 56
End: 2021-08-16
Payer: COMMERCIAL

## 2021-08-16 DIAGNOSIS — R41.89 COGNITIVE AND BEHAVIORAL CHANGES: ICD-10-CM

## 2021-08-16 DIAGNOSIS — I63.511 ACUTE ISCHEMIC RIGHT MCA STROKE: Primary | ICD-10-CM

## 2021-08-16 DIAGNOSIS — F32.9 REACTIVE DEPRESSION: ICD-10-CM

## 2021-08-16 DIAGNOSIS — R46.89 COGNITIVE AND BEHAVIORAL CHANGES: ICD-10-CM

## 2021-08-16 DIAGNOSIS — F10.20 ALCOHOLISM: ICD-10-CM

## 2021-08-16 PROCEDURE — 99499 UNLISTED E&M SERVICE: CPT | Mod: 95,,, | Performed by: PSYCHIATRY & NEUROLOGY

## 2021-08-16 PROCEDURE — 99499 NO LOS: ICD-10-PCS | Mod: 95,,, | Performed by: PSYCHIATRY & NEUROLOGY

## 2021-08-16 PROCEDURE — 90791 PSYCH DIAGNOSTIC EVALUATION: CPT | Mod: 95,,, | Performed by: PSYCHIATRY & NEUROLOGY

## 2021-08-16 PROCEDURE — 90791 PR PSYCHIATRIC DIAGNOSTIC EVALUATION: ICD-10-PCS | Mod: 95,,, | Performed by: PSYCHIATRY & NEUROLOGY

## 2021-08-16 PROCEDURE — 3044F HG A1C LEVEL LT 7.0%: CPT | Mod: CPTII,,, | Performed by: PSYCHIATRY & NEUROLOGY

## 2021-08-16 PROCEDURE — 3044F PR MOST RECENT HEMOGLOBIN A1C LEVEL <7.0%: ICD-10-PCS | Mod: CPTII,,, | Performed by: PSYCHIATRY & NEUROLOGY

## 2021-08-18 ENCOUNTER — TELEPHONE (OUTPATIENT)
Dept: NEUROLOGY | Facility: CLINIC | Age: 56
End: 2021-08-18

## 2021-08-21 PROBLEM — R46.89 COGNITIVE AND BEHAVIORAL CHANGES: Status: ACTIVE | Noted: 2021-08-21

## 2021-08-21 PROBLEM — R41.89 COGNITIVE AND BEHAVIORAL CHANGES: Status: ACTIVE | Noted: 2021-08-21

## 2021-08-24 ENCOUNTER — OFFICE VISIT (OUTPATIENT)
Dept: NEUROLOGY | Facility: CLINIC | Age: 56
End: 2021-08-24
Payer: COMMERCIAL

## 2021-08-24 DIAGNOSIS — F41.9 ANXIETY: ICD-10-CM

## 2021-08-24 DIAGNOSIS — R46.89 COGNITIVE AND BEHAVIORAL CHANGES: ICD-10-CM

## 2021-08-24 DIAGNOSIS — R41.89 COGNITIVE AND BEHAVIORAL CHANGES: ICD-10-CM

## 2021-08-24 DIAGNOSIS — I63.511 CEREBROVASCULAR ACCIDENT (CVA) DUE TO OCCLUSION OF RIGHT MIDDLE CEREBRAL ARTERY: Primary | ICD-10-CM

## 2021-08-24 DIAGNOSIS — F32.9 REACTIVE DEPRESSION: ICD-10-CM

## 2021-08-24 DIAGNOSIS — F10.20 ALCOHOLISM: ICD-10-CM

## 2021-08-24 DIAGNOSIS — F10.10 ALCOHOL ABUSE: ICD-10-CM

## 2021-08-24 PROCEDURE — 99499 NO LOS: ICD-10-PCS | Mod: S$GLB,,, | Performed by: PSYCHIATRY & NEUROLOGY

## 2021-08-24 PROCEDURE — 3044F HG A1C LEVEL LT 7.0%: CPT | Mod: CPTII,S$GLB,, | Performed by: PSYCHIATRY & NEUROLOGY

## 2021-08-24 PROCEDURE — 99999 PR PBB SHADOW E&M-EST. PATIENT-LVL II: ICD-10-PCS | Mod: PBBFAC,,, | Performed by: PSYCHIATRY & NEUROLOGY

## 2021-08-24 PROCEDURE — 96132 NRPSYC TST EVAL PHYS/QHP 1ST: CPT | Mod: S$GLB,,, | Performed by: PSYCHIATRY & NEUROLOGY

## 2021-08-24 PROCEDURE — 99499 UNLISTED E&M SERVICE: CPT | Mod: S$GLB,,, | Performed by: PSYCHIATRY & NEUROLOGY

## 2021-08-24 PROCEDURE — 96138 PSYCL/NRPSYC TECH 1ST: CPT | Mod: S$GLB,,, | Performed by: PSYCHIATRY & NEUROLOGY

## 2021-08-24 PROCEDURE — 4010F ACE/ARB THERAPY RXD/TAKEN: CPT | Mod: CPTII,S$GLB,, | Performed by: PSYCHIATRY & NEUROLOGY

## 2021-08-24 PROCEDURE — 96139 PSYCL/NRPSYC TST TECH EA: CPT | Mod: S$GLB,,, | Performed by: PSYCHIATRY & NEUROLOGY

## 2021-08-24 PROCEDURE — 96133 NRPSYC TST EVAL PHYS/QHP EA: CPT | Mod: S$GLB,,, | Performed by: PSYCHIATRY & NEUROLOGY

## 2021-08-24 PROCEDURE — 96138 PR PSYCH/NEUROPSYCH TEST ADMIN/SCORING, BY TECH, 2+ TESTS, 1ST 30 MIN: ICD-10-PCS | Mod: S$GLB,,, | Performed by: PSYCHIATRY & NEUROLOGY

## 2021-08-24 PROCEDURE — 3044F PR MOST RECENT HEMOGLOBIN A1C LEVEL <7.0%: ICD-10-PCS | Mod: CPTII,S$GLB,, | Performed by: PSYCHIATRY & NEUROLOGY

## 2021-08-24 PROCEDURE — 4010F PR ACE/ARB THEARPY RXD/TAKEN: ICD-10-PCS | Mod: CPTII,S$GLB,, | Performed by: PSYCHIATRY & NEUROLOGY

## 2021-08-24 PROCEDURE — 96132 PR NEUROPSYCHOLOGIC TEST EVAL SVCS, 1ST HR: ICD-10-PCS | Mod: S$GLB,,, | Performed by: PSYCHIATRY & NEUROLOGY

## 2021-08-24 PROCEDURE — 99999 PR PBB SHADOW E&M-EST. PATIENT-LVL II: CPT | Mod: PBBFAC,,, | Performed by: PSYCHIATRY & NEUROLOGY

## 2021-08-24 PROCEDURE — 96139 PR PSYCH/NEUROPSYCH TEST ADMIN/SCORING, BY TECH, 2+ TESTS, EA ADDTL 30 MIN: ICD-10-PCS | Mod: S$GLB,,, | Performed by: PSYCHIATRY & NEUROLOGY

## 2021-08-24 PROCEDURE — 96133 PR NEUROPSYCHOLOGIC TEST EVAL SVCS, EA ADDTL HR: ICD-10-PCS | Mod: S$GLB,,, | Performed by: PSYCHIATRY & NEUROLOGY

## 2021-09-09 ENCOUNTER — PATIENT MESSAGE (OUTPATIENT)
Dept: NEUROLOGY | Facility: CLINIC | Age: 56
End: 2021-09-09

## 2021-09-09 ENCOUNTER — OFFICE VISIT (OUTPATIENT)
Dept: NEUROLOGY | Facility: CLINIC | Age: 56
End: 2021-09-09
Payer: COMMERCIAL

## 2021-09-09 DIAGNOSIS — R41.89 COGNITIVE AND BEHAVIORAL CHANGES: ICD-10-CM

## 2021-09-09 DIAGNOSIS — R46.89 COGNITIVE AND BEHAVIORAL CHANGES: ICD-10-CM

## 2021-09-09 DIAGNOSIS — I63.511 ACUTE ISCHEMIC RIGHT MCA STROKE: Primary | ICD-10-CM

## 2021-09-09 DIAGNOSIS — F10.10 ALCOHOL ABUSE: ICD-10-CM

## 2021-09-09 DIAGNOSIS — F32.9 REACTIVE DEPRESSION: ICD-10-CM

## 2021-09-09 PROCEDURE — 4010F PR ACE/ARB THEARPY RXD/TAKEN: ICD-10-PCS | Mod: CPTII,S$GLB,, | Performed by: PSYCHIATRY & NEUROLOGY

## 2021-09-09 PROCEDURE — 3044F PR MOST RECENT HEMOGLOBIN A1C LEVEL <7.0%: ICD-10-PCS | Mod: CPTII,S$GLB,, | Performed by: PSYCHIATRY & NEUROLOGY

## 2021-09-09 PROCEDURE — 3044F HG A1C LEVEL LT 7.0%: CPT | Mod: CPTII,S$GLB,, | Performed by: PSYCHIATRY & NEUROLOGY

## 2021-09-09 PROCEDURE — 4010F ACE/ARB THERAPY RXD/TAKEN: CPT | Mod: CPTII,S$GLB,, | Performed by: PSYCHIATRY & NEUROLOGY

## 2021-09-09 PROCEDURE — 99499 NO LOS: ICD-10-PCS | Mod: S$GLB,,, | Performed by: PSYCHIATRY & NEUROLOGY

## 2021-09-09 PROCEDURE — 99499 UNLISTED E&M SERVICE: CPT | Mod: S$GLB,,, | Performed by: PSYCHIATRY & NEUROLOGY

## 2021-09-11 PROBLEM — F41.9 ANXIETY: Status: ACTIVE | Noted: 2021-09-11

## 2021-09-12 ENCOUNTER — PATIENT MESSAGE (OUTPATIENT)
Dept: NEUROLOGY | Facility: CLINIC | Age: 56
End: 2021-09-12

## 2021-10-07 ENCOUNTER — LAB VISIT (OUTPATIENT)
Dept: LAB | Facility: HOSPITAL | Age: 56
End: 2021-10-07
Attending: FAMILY MEDICINE
Payer: COMMERCIAL

## 2021-10-07 ENCOUNTER — OFFICE VISIT (OUTPATIENT)
Dept: NEUROLOGY | Facility: CLINIC | Age: 56
End: 2021-10-07
Payer: COMMERCIAL

## 2021-10-07 VITALS
SYSTOLIC BLOOD PRESSURE: 135 MMHG | HEIGHT: 69 IN | BODY MASS INDEX: 24.85 KG/M2 | RESPIRATION RATE: 18 BRPM | HEART RATE: 72 BPM | DIASTOLIC BLOOD PRESSURE: 69 MMHG | WEIGHT: 167.75 LBS

## 2021-10-07 DIAGNOSIS — F10.20 ALCOHOLISM: ICD-10-CM

## 2021-10-07 DIAGNOSIS — Z72.0 TOBACCO ABUSE: ICD-10-CM

## 2021-10-07 DIAGNOSIS — F32.9 REACTIVE DEPRESSION: ICD-10-CM

## 2021-10-07 DIAGNOSIS — R46.89 COGNITIVE AND BEHAVIORAL CHANGES: ICD-10-CM

## 2021-10-07 DIAGNOSIS — R41.89 COGNITIVE AND BEHAVIORAL CHANGES: ICD-10-CM

## 2021-10-07 DIAGNOSIS — M54.42 ACUTE MIDLINE LOW BACK PAIN WITH BILATERAL SCIATICA: ICD-10-CM

## 2021-10-07 DIAGNOSIS — M54.41 ACUTE MIDLINE LOW BACK PAIN WITH BILATERAL SCIATICA: ICD-10-CM

## 2021-10-07 DIAGNOSIS — E78.2 MIXED HYPERLIPIDEMIA: ICD-10-CM

## 2021-10-07 DIAGNOSIS — E53.8 B12 DEFICIENCY: ICD-10-CM

## 2021-10-07 DIAGNOSIS — I10 ESSENTIAL HYPERTENSION: ICD-10-CM

## 2021-10-07 DIAGNOSIS — I63.511 ACUTE ISCHEMIC RIGHT MCA STROKE: Primary | ICD-10-CM

## 2021-10-07 PROBLEM — Z86.73 CHRONIC ISCHEMIC RIGHT MCA STROKE: Status: ACTIVE | Noted: 2021-02-02

## 2021-10-07 LAB
FOLATE SERPL-MCNC: 11.7 NG/ML (ref 4–24)
VIT B12 SERPL-MCNC: 308 PG/ML (ref 210–950)

## 2021-10-07 PROCEDURE — 82746 ASSAY OF FOLIC ACID SERUM: CPT | Performed by: NURSE PRACTITIONER

## 2021-10-07 PROCEDURE — 3075F SYST BP GE 130 - 139MM HG: CPT | Mod: CPTII,S$GLB,, | Performed by: NURSE PRACTITIONER

## 2021-10-07 PROCEDURE — 3008F PR BODY MASS INDEX (BMI) DOCUMENTED: ICD-10-PCS | Mod: CPTII,S$GLB,, | Performed by: NURSE PRACTITIONER

## 2021-10-07 PROCEDURE — 1160F PR REVIEW ALL MEDS BY PRESCRIBER/CLIN PHARMACIST DOCUMENTED: ICD-10-PCS | Mod: CPTII,S$GLB,, | Performed by: NURSE PRACTITIONER

## 2021-10-07 PROCEDURE — 99999 PR PBB SHADOW E&M-EST. PATIENT-LVL V: CPT | Mod: PBBFAC,,, | Performed by: NURSE PRACTITIONER

## 2021-10-07 PROCEDURE — 83921 ORGANIC ACID SINGLE QUANT: CPT | Performed by: NURSE PRACTITIONER

## 2021-10-07 PROCEDURE — 99215 PR OFFICE/OUTPT VISIT, EST, LEVL V, 40-54 MIN: ICD-10-PCS | Mod: S$GLB,,, | Performed by: NURSE PRACTITIONER

## 2021-10-07 PROCEDURE — 84425 ASSAY OF VITAMIN B-1: CPT | Performed by: NURSE PRACTITIONER

## 2021-10-07 PROCEDURE — 3044F HG A1C LEVEL LT 7.0%: CPT | Mod: CPTII,S$GLB,, | Performed by: NURSE PRACTITIONER

## 2021-10-07 PROCEDURE — 1159F PR MEDICATION LIST DOCUMENTED IN MEDICAL RECORD: ICD-10-PCS | Mod: CPTII,S$GLB,, | Performed by: NURSE PRACTITIONER

## 2021-10-07 PROCEDURE — 1159F MED LIST DOCD IN RCRD: CPT | Mod: CPTII,S$GLB,, | Performed by: NURSE PRACTITIONER

## 2021-10-07 PROCEDURE — 3078F PR MOST RECENT DIASTOLIC BLOOD PRESSURE < 80 MM HG: ICD-10-PCS | Mod: CPTII,S$GLB,, | Performed by: NURSE PRACTITIONER

## 2021-10-07 PROCEDURE — 3075F PR MOST RECENT SYSTOLIC BLOOD PRESS GE 130-139MM HG: ICD-10-PCS | Mod: CPTII,S$GLB,, | Performed by: NURSE PRACTITIONER

## 2021-10-07 PROCEDURE — 82607 VITAMIN B-12: CPT | Performed by: NURSE PRACTITIONER

## 2021-10-07 PROCEDURE — 3044F PR MOST RECENT HEMOGLOBIN A1C LEVEL <7.0%: ICD-10-PCS | Mod: CPTII,S$GLB,, | Performed by: NURSE PRACTITIONER

## 2021-10-07 PROCEDURE — 99999 PR PBB SHADOW E&M-EST. PATIENT-LVL V: ICD-10-PCS | Mod: PBBFAC,,, | Performed by: NURSE PRACTITIONER

## 2021-10-07 PROCEDURE — 4010F ACE/ARB THERAPY RXD/TAKEN: CPT | Mod: CPTII,S$GLB,, | Performed by: NURSE PRACTITIONER

## 2021-10-07 PROCEDURE — 3078F DIAST BP <80 MM HG: CPT | Mod: CPTII,S$GLB,, | Performed by: NURSE PRACTITIONER

## 2021-10-07 PROCEDURE — 4010F PR ACE/ARB THEARPY RXD/TAKEN: ICD-10-PCS | Mod: CPTII,S$GLB,, | Performed by: NURSE PRACTITIONER

## 2021-10-07 PROCEDURE — 1160F RVW MEDS BY RX/DR IN RCRD: CPT | Mod: CPTII,S$GLB,, | Performed by: NURSE PRACTITIONER

## 2021-10-07 PROCEDURE — 99215 OFFICE O/P EST HI 40 MIN: CPT | Mod: S$GLB,,, | Performed by: NURSE PRACTITIONER

## 2021-10-07 PROCEDURE — 3008F BODY MASS INDEX DOCD: CPT | Mod: CPTII,S$GLB,, | Performed by: NURSE PRACTITIONER

## 2021-10-07 RX ORDER — CITALOPRAM 10 MG/1
10 TABLET ORAL DAILY
COMMUNITY
Start: 2021-05-17 | End: 2021-10-13

## 2021-10-12 LAB
METHYLMALONATE SERPL-SCNC: 0.34 UMOL/L
VIT B1 BLD-MCNC: 67 UG/L (ref 38–122)

## 2021-11-01 ENCOUNTER — OFFICE VISIT (OUTPATIENT)
Dept: CARDIOLOGY | Facility: CLINIC | Age: 56
End: 2021-11-01
Payer: COMMERCIAL

## 2021-11-01 VITALS
DIASTOLIC BLOOD PRESSURE: 65 MMHG | OXYGEN SATURATION: 99 % | BODY MASS INDEX: 24.16 KG/M2 | HEIGHT: 69 IN | WEIGHT: 163.13 LBS | HEART RATE: 72 BPM | SYSTOLIC BLOOD PRESSURE: 115 MMHG

## 2021-11-01 DIAGNOSIS — I10 ESSENTIAL HYPERTENSION: Primary | ICD-10-CM

## 2021-11-01 DIAGNOSIS — E78.2 MIXED HYPERLIPIDEMIA: ICD-10-CM

## 2021-11-01 DIAGNOSIS — Z86.73 CHRONIC ISCHEMIC RIGHT MCA STROKE: ICD-10-CM

## 2021-11-01 PROCEDURE — 3008F BODY MASS INDEX DOCD: CPT | Mod: CPTII,S$GLB,, | Performed by: INTERNAL MEDICINE

## 2021-11-01 PROCEDURE — 3044F HG A1C LEVEL LT 7.0%: CPT | Mod: CPTII,S$GLB,, | Performed by: INTERNAL MEDICINE

## 2021-11-01 PROCEDURE — 1159F MED LIST DOCD IN RCRD: CPT | Mod: CPTII,S$GLB,, | Performed by: INTERNAL MEDICINE

## 2021-11-01 PROCEDURE — 3078F DIAST BP <80 MM HG: CPT | Mod: CPTII,S$GLB,, | Performed by: INTERNAL MEDICINE

## 2021-11-01 PROCEDURE — 4010F PR ACE/ARB THEARPY RXD/TAKEN: ICD-10-PCS | Mod: CPTII,S$GLB,, | Performed by: INTERNAL MEDICINE

## 2021-11-01 PROCEDURE — 1159F PR MEDICATION LIST DOCUMENTED IN MEDICAL RECORD: ICD-10-PCS | Mod: CPTII,S$GLB,, | Performed by: INTERNAL MEDICINE

## 2021-11-01 PROCEDURE — 4010F ACE/ARB THERAPY RXD/TAKEN: CPT | Mod: CPTII,S$GLB,, | Performed by: INTERNAL MEDICINE

## 2021-11-01 PROCEDURE — 99214 PR OFFICE/OUTPT VISIT, EST, LEVL IV, 30-39 MIN: ICD-10-PCS | Mod: S$GLB,,, | Performed by: INTERNAL MEDICINE

## 2021-11-01 PROCEDURE — 3044F PR MOST RECENT HEMOGLOBIN A1C LEVEL <7.0%: ICD-10-PCS | Mod: CPTII,S$GLB,, | Performed by: INTERNAL MEDICINE

## 2021-11-01 PROCEDURE — 99214 OFFICE O/P EST MOD 30 MIN: CPT | Mod: S$GLB,,, | Performed by: INTERNAL MEDICINE

## 2021-11-01 PROCEDURE — 3078F PR MOST RECENT DIASTOLIC BLOOD PRESSURE < 80 MM HG: ICD-10-PCS | Mod: CPTII,S$GLB,, | Performed by: INTERNAL MEDICINE

## 2021-11-01 PROCEDURE — 1160F RVW MEDS BY RX/DR IN RCRD: CPT | Mod: CPTII,S$GLB,, | Performed by: INTERNAL MEDICINE

## 2021-11-01 PROCEDURE — 3074F PR MOST RECENT SYSTOLIC BLOOD PRESSURE < 130 MM HG: ICD-10-PCS | Mod: CPTII,S$GLB,, | Performed by: INTERNAL MEDICINE

## 2021-11-01 PROCEDURE — 1160F PR REVIEW ALL MEDS BY PRESCRIBER/CLIN PHARMACIST DOCUMENTED: ICD-10-PCS | Mod: CPTII,S$GLB,, | Performed by: INTERNAL MEDICINE

## 2021-11-01 PROCEDURE — 99999 PR PBB SHADOW E&M-EST. PATIENT-LVL III: CPT | Mod: PBBFAC,,, | Performed by: INTERNAL MEDICINE

## 2021-11-01 PROCEDURE — 3008F PR BODY MASS INDEX (BMI) DOCUMENTED: ICD-10-PCS | Mod: CPTII,S$GLB,, | Performed by: INTERNAL MEDICINE

## 2021-11-01 PROCEDURE — 3074F SYST BP LT 130 MM HG: CPT | Mod: CPTII,S$GLB,, | Performed by: INTERNAL MEDICINE

## 2021-11-01 PROCEDURE — 99999 PR PBB SHADOW E&M-EST. PATIENT-LVL III: ICD-10-PCS | Mod: PBBFAC,,, | Performed by: INTERNAL MEDICINE

## 2021-11-02 ENCOUNTER — CLINICAL SUPPORT (OUTPATIENT)
Dept: CARDIOLOGY | Facility: HOSPITAL | Age: 56
End: 2021-11-02
Attending: INTERNAL MEDICINE
Payer: COMMERCIAL

## 2021-11-02 DIAGNOSIS — Z86.73 CHRONIC ISCHEMIC RIGHT MCA STROKE: ICD-10-CM

## 2021-11-02 PROCEDURE — 93270 REMOTE 30 DAY ECG REV/REPORT: CPT | Mod: PO

## 2021-11-02 PROCEDURE — 93272 CARDIAC EVENT MONITOR (CUPID ONLY): ICD-10-PCS | Mod: ,,, | Performed by: INTERNAL MEDICINE

## 2021-11-02 PROCEDURE — 93272 ECG/REVIEW INTERPRET ONLY: CPT | Mod: ,,, | Performed by: INTERNAL MEDICINE

## 2021-12-21 ENCOUNTER — TELEPHONE (OUTPATIENT)
Dept: CARDIOLOGY | Facility: CLINIC | Age: 56
End: 2021-12-21
Payer: COMMERCIAL

## 2021-12-23 ENCOUNTER — TELEPHONE (OUTPATIENT)
Dept: NEUROLOGY | Facility: CLINIC | Age: 56
End: 2021-12-23
Payer: COMMERCIAL

## 2021-12-29 ENCOUNTER — TELEPHONE (OUTPATIENT)
Dept: NEUROLOGY | Facility: CLINIC | Age: 56
End: 2021-12-29
Payer: COMMERCIAL

## 2021-12-29 NOTE — TELEPHONE ENCOUNTER
----- Message from Ruthann Mejia NP sent at 12/29/2021 10:06 AM CST -----  Contact: Dr Elizabeth/[phone]  I have no clue who Dr. Elizabeth is. Anything regarding his disability status would be in my note, in letters or in disability forms that I filled out for him. Please find out what needs to be done here.   ----- Message -----  From: Susy Soliz MA  Sent: 12/21/2021   4:23 PM CST  To: Ruthann Mejia NP    Was this something that needed to be done since last visit?  ----- Message -----  From: Susy Collins  Sent: 12/21/2021   4:07 PM CST  To: Roberto DANIELLE Staff    Dr Elizabeth would like a courtesy call in regards patient disability status. Please call and advise. Thank you.

## 2022-01-04 ENCOUNTER — TELEPHONE (OUTPATIENT)
Dept: NEUROLOGY | Facility: CLINIC | Age: 57
End: 2022-01-04

## 2022-01-04 NOTE — TELEPHONE ENCOUNTER
Left message for the pt to call back in regards to Dr Elizabeth requesting a call regarding disability. Previous message states Dr Elizabeth would like a courtesy call related to disability, Ruthann reports she is unaware of any disability requests.

## 2022-06-22 ENCOUNTER — OFFICE VISIT (OUTPATIENT)
Dept: NEPHROLOGY | Facility: CLINIC | Age: 57
End: 2022-06-22
Payer: MEDICAID

## 2022-06-22 VITALS
SYSTOLIC BLOOD PRESSURE: 110 MMHG | WEIGHT: 166 LBS | HEIGHT: 69 IN | BODY MASS INDEX: 24.59 KG/M2 | HEART RATE: 77 BPM | DIASTOLIC BLOOD PRESSURE: 62 MMHG | OXYGEN SATURATION: 98 %

## 2022-06-22 DIAGNOSIS — I10 ESSENTIAL HYPERTENSION: ICD-10-CM

## 2022-06-22 DIAGNOSIS — F10.10 ALCOHOL ABUSE: ICD-10-CM

## 2022-06-22 DIAGNOSIS — Z86.73 CHRONIC ISCHEMIC RIGHT MCA STROKE: ICD-10-CM

## 2022-06-22 DIAGNOSIS — D75.1 POLYCYTHEMIA: ICD-10-CM

## 2022-06-22 DIAGNOSIS — E87.6 HYPOKALEMIA: Primary | ICD-10-CM

## 2022-06-22 DIAGNOSIS — F10.20 ALCOHOLISM: ICD-10-CM

## 2022-06-22 PROCEDURE — 3066F NEPHROPATHY DOC TX: CPT | Mod: CPTII,,, | Performed by: INTERNAL MEDICINE

## 2022-06-22 PROCEDURE — 4010F PR ACE/ARB THEARPY RXD/TAKEN: ICD-10-PCS | Mod: CPTII,,, | Performed by: INTERNAL MEDICINE

## 2022-06-22 PROCEDURE — 3008F PR BODY MASS INDEX (BMI) DOCUMENTED: ICD-10-PCS | Mod: CPTII,,, | Performed by: INTERNAL MEDICINE

## 2022-06-22 PROCEDURE — 1160F RVW MEDS BY RX/DR IN RCRD: CPT | Mod: CPTII,,, | Performed by: INTERNAL MEDICINE

## 2022-06-22 PROCEDURE — 1160F PR REVIEW ALL MEDS BY PRESCRIBER/CLIN PHARMACIST DOCUMENTED: ICD-10-PCS | Mod: CPTII,,, | Performed by: INTERNAL MEDICINE

## 2022-06-22 PROCEDURE — 99999 PR PBB SHADOW E&M-EST. PATIENT-LVL III: CPT | Mod: PBBFAC,,, | Performed by: INTERNAL MEDICINE

## 2022-06-22 PROCEDURE — 99204 PR OFFICE/OUTPT VISIT, NEW, LEVL IV, 45-59 MIN: ICD-10-PCS | Mod: S$PBB,,, | Performed by: INTERNAL MEDICINE

## 2022-06-22 PROCEDURE — 3074F SYST BP LT 130 MM HG: CPT | Mod: CPTII,,, | Performed by: INTERNAL MEDICINE

## 2022-06-22 PROCEDURE — 3008F BODY MASS INDEX DOCD: CPT | Mod: CPTII,,, | Performed by: INTERNAL MEDICINE

## 2022-06-22 PROCEDURE — 99213 OFFICE O/P EST LOW 20 MIN: CPT | Mod: PBBFAC,PN | Performed by: INTERNAL MEDICINE

## 2022-06-22 PROCEDURE — 1159F PR MEDICATION LIST DOCUMENTED IN MEDICAL RECORD: ICD-10-PCS | Mod: CPTII,,, | Performed by: INTERNAL MEDICINE

## 2022-06-22 PROCEDURE — 4010F ACE/ARB THERAPY RXD/TAKEN: CPT | Mod: CPTII,,, | Performed by: INTERNAL MEDICINE

## 2022-06-22 PROCEDURE — 3078F DIAST BP <80 MM HG: CPT | Mod: CPTII,,, | Performed by: INTERNAL MEDICINE

## 2022-06-22 PROCEDURE — 99204 OFFICE O/P NEW MOD 45 MIN: CPT | Mod: S$PBB,,, | Performed by: INTERNAL MEDICINE

## 2022-06-22 PROCEDURE — 3078F PR MOST RECENT DIASTOLIC BLOOD PRESSURE < 80 MM HG: ICD-10-PCS | Mod: CPTII,,, | Performed by: INTERNAL MEDICINE

## 2022-06-22 PROCEDURE — 99999 PR PBB SHADOW E&M-EST. PATIENT-LVL III: ICD-10-PCS | Mod: PBBFAC,,, | Performed by: INTERNAL MEDICINE

## 2022-06-22 PROCEDURE — 3074F PR MOST RECENT SYSTOLIC BLOOD PRESSURE < 130 MM HG: ICD-10-PCS | Mod: CPTII,,, | Performed by: INTERNAL MEDICINE

## 2022-06-22 PROCEDURE — 1159F MED LIST DOCD IN RCRD: CPT | Mod: CPTII,,, | Performed by: INTERNAL MEDICINE

## 2022-06-22 PROCEDURE — 3066F PR DOCUMENTATION OF TREATMENT FOR NEPHROPATHY: ICD-10-PCS | Mod: CPTII,,, | Performed by: INTERNAL MEDICINE

## 2022-06-22 RX ORDER — SILDENAFIL 100 MG/1
100 TABLET, FILM COATED ORAL DAILY PRN
COMMUNITY

## 2022-06-22 NOTE — PROGRESS NOTES
"Subjective:       Patient ID: Palmer Guerra is a 56 y.o. White male who presents for initial evaluation of hypokalemia referred by hospitalist.     Renal function per chart review with sr cr baseline of 0.9 - 1 mg/dL and low BUN.  - Low BUN: 40 oz of fluid (no overhydration), he is not eating a lot of protein in the past  - Hypokalemia: due to poor PO intake and malnutrition, denies NSAIDs use or EtOH  - Hypokalemia may had cause him to have CK elevation from rhabdo, but not CK from Lipitor causing hypokalemia  - HTN diagnosed about 15 years ago. Patient reports good adherence to the current regiment, which includes Amlodipine. They are not following low salt diet. Previously on Losartan and HCTZ stopped due to hypokalemia (its contra intuitive for ARB). Never hospitalized for hypotension/syncopal episodes. Stroke with uncontrolled HTN.    Denies use of NSAIDs, nephrolithiasis or fam Hx of renal disease.      Review of Systems   Constitutional: Negative for chills, fatigue and fever.   HENT: Negative for hearing loss, trouble swallowing and voice change.    Respiratory: Negative for cough, shortness of breath and wheezing.    Cardiovascular: Negative for chest pain, palpitations and leg swelling.   Gastrointestinal: Negative for abdominal distention, abdominal pain, constipation and diarrhea.   Endocrine: Negative for polydipsia, polyphagia and polyuria.   Genitourinary: Negative for dysuria, flank pain, frequency and hematuria.   Musculoskeletal: Negative for arthralgias, back pain and myalgias.   Skin: Negative for rash and wound.   Neurological: Negative for dizziness, syncope, weakness and light-headedness.   Hematological: Negative for adenopathy. Does not bruise/bleed easily.     he is not   The past medical, family and social histories were reviewed for this encounter.     /62 (BP Location: Left arm, Patient Position: Sitting, BP Method: Medium (Manual))   Pulse 77   Ht 5' 9" (1.753 m)   Wt " 75.3 kg (166 lb)   SpO2 98%   BMI 24.51 kg/m²     Objective:      Physical Exam  Vitals reviewed.   Constitutional:       General: He is not in acute distress.     Appearance: He is well-developed. He is obese. He is ill-appearing.   HENT:      Head: Normocephalic and atraumatic.      Mouth/Throat:      Mouth: Mucous membranes are moist.      Pharynx: Oropharynx is clear.   Eyes:      General: No scleral icterus.     Extraocular Movements: Extraocular movements intact.      Conjunctiva/sclera: Conjunctivae normal.   Neck:      Vascular: No JVD.   Cardiovascular:      Rate and Rhythm: Normal rate and regular rhythm.      Heart sounds: Normal heart sounds. No murmur heard.    No friction rub. No gallop.   Pulmonary:      Effort: Pulmonary effort is normal. No respiratory distress.      Breath sounds: Normal breath sounds. No wheezing.   Abdominal:      General: Bowel sounds are normal. There is no distension.      Palpations: Abdomen is soft.      Tenderness: There is no abdominal tenderness.   Musculoskeletal:      Cervical back: Normal range of motion.      Right lower leg: No edema.      Left lower leg: No edema.   Skin:     General: Skin is warm and dry.      Capillary Refill: Capillary refill takes less than 2 seconds.      Findings: No rash.   Neurological:      General: No focal deficit present.      Mental Status: He is alert and oriented to person, place, and time.   Psychiatric:         Mood and Affect: Mood normal.         Behavior: Behavior normal.         Assessment:       1. Hypokalemia    2. Alcoholism    3. Chronic ischemic right MCA stroke    4. Alcohol abuse    5. Essential hypertension    6. Polycythemia        Plan:   Return to clinic PRN    Baseline creatinine is 0.9 - 1 mg/dL without proteinuria  Lab Results   Component Value Date    CREATININE 1.02 06/14/2022       HTN   - BP well controlled: continue current medications, avoid hypotension and dehydration    Low BUN   - Increase protein  intake    Hypokalemia    - Off replacement and with normal levels    - Check Mg and replace is needed    Med changes: none

## 2022-09-01 ENCOUNTER — TELEPHONE (OUTPATIENT)
Dept: CARDIOLOGY | Facility: CLINIC | Age: 57
End: 2022-09-01
Payer: MEDICAID

## 2022-09-01 NOTE — TELEPHONE ENCOUNTER
----- Message from Dixie Boothe sent at 9/1/2022  3:22 PM CDT -----  Who Called: Patient    What is the reqeust in detail: Requesting call back to the Disability office to discuss his disability form. Patient's disability funds have been suspended because they are in need of the following:  Each Dated 04/01/2021 through today  Test results  Operative Reports  Rx history  Treatment plans    The Disability Department can be reached at [facility] [phone] ext [phone]. Documents can be faxed to [phone]    Can the clinic reply by [facility]? No    Best Call Back Number: [phone]    Additional Information:

## 2022-09-02 ENCOUNTER — TELEPHONE (OUTPATIENT)
Dept: NEUROLOGY | Facility: CLINIC | Age: 57
End: 2022-09-02
Payer: MEDICAID

## 2022-09-02 DIAGNOSIS — R46.89 COGNITIVE AND BEHAVIORAL CHANGES: Primary | ICD-10-CM

## 2022-09-02 DIAGNOSIS — R41.89 COGNITIVE AND BEHAVIORAL CHANGES: Primary | ICD-10-CM

## 2022-09-02 NOTE — TELEPHONE ENCOUNTER
----- Message from Stephie Godinez sent at 9/1/2022  3:03 PM CDT -----  Regarding: advice  Contact: patient  Type: Needs Medical Advice  Who Called:  patient  Symptoms (please be specific):    How long has patient had these symptoms:    Pharmacy name and phone #:    Best Call Back Number: [phone]  Additional Information: Patient is calling regarding the paperwork disability that was sent to be filled out. Patient states they have not received the paperwork and his disability was suspended. Patient states they needs clinical reports dating back 04/02/21 to present day along with imaging. Patient would like nurse to call Bonnie Cabrales at disability office.  Number is [phone] ext [phone] to get details of what is needed. Please fax information to [phone]. Please call patient to advise.Thanks!

## 2022-09-06 NOTE — TELEPHONE ENCOUNTER
Lvm that pt is not disabled from a cardiac standpoint and documentation and records will have to come from physician treating him for his stroke.

## 2022-09-13 ENCOUNTER — TELEPHONE (OUTPATIENT)
Dept: NEUROLOGY | Facility: CLINIC | Age: 57
End: 2022-09-13
Payer: MEDICAID

## 2022-09-13 NOTE — TELEPHONE ENCOUNTER
----- Message from Deni Leone sent at 9/13/2022  2:17 PM CDT -----  Regarding: sooner appt  Type:  Sooner Appointment Request    Caller is requesting a sooner appointment.      Name of Caller:  wife // jamal    When is the first available appointment?  None through end of cal // ins    Symptoms:  stroke f/u // last seen 10/2021    Best Call Back Number:  [phone]      Additional Information:

## 2022-09-15 ENCOUNTER — OFFICE VISIT (OUTPATIENT)
Dept: NEUROLOGY | Facility: CLINIC | Age: 57
End: 2022-09-15
Payer: MEDICAID

## 2022-09-15 VITALS
HEART RATE: 89 BPM | SYSTOLIC BLOOD PRESSURE: 134 MMHG | WEIGHT: 180.56 LBS | BODY MASS INDEX: 26.66 KG/M2 | RESPIRATION RATE: 18 BRPM | DIASTOLIC BLOOD PRESSURE: 71 MMHG

## 2022-09-15 DIAGNOSIS — Z72.0 TOBACCO ABUSE: ICD-10-CM

## 2022-09-15 DIAGNOSIS — I10 ESSENTIAL HYPERTENSION: ICD-10-CM

## 2022-09-15 DIAGNOSIS — I69.334 MONOPLEGIA OF UPPER LIMB FOLLOWING CEREBRAL INFARCTION AFFECTING LEFT NON-DOMINANT SIDE: Primary | ICD-10-CM

## 2022-09-15 DIAGNOSIS — E78.2 MIXED HYPERLIPIDEMIA: ICD-10-CM

## 2022-09-15 DIAGNOSIS — E53.8 B12 DEFICIENCY: ICD-10-CM

## 2022-09-15 DIAGNOSIS — Z86.73 CHRONIC ISCHEMIC RIGHT MCA STROKE: ICD-10-CM

## 2022-09-15 DIAGNOSIS — R41.89 COGNITIVE AND BEHAVIORAL CHANGES: ICD-10-CM

## 2022-09-15 DIAGNOSIS — R46.89 COGNITIVE AND BEHAVIORAL CHANGES: ICD-10-CM

## 2022-09-15 DIAGNOSIS — F10.10 ALCOHOL ABUSE: ICD-10-CM

## 2022-09-15 DIAGNOSIS — F41.9 ANXIETY: ICD-10-CM

## 2022-09-15 PROCEDURE — 99999 PR PBB SHADOW E&M-EST. PATIENT-LVL III: CPT | Mod: PBBFAC,,, | Performed by: NURSE PRACTITIONER

## 2022-09-15 PROCEDURE — 99999 PR PBB SHADOW E&M-EST. PATIENT-LVL III: ICD-10-PCS | Mod: PBBFAC,,, | Performed by: NURSE PRACTITIONER

## 2022-09-15 PROCEDURE — 99214 OFFICE O/P EST MOD 30 MIN: CPT | Mod: S$PBB,,, | Performed by: NURSE PRACTITIONER

## 2022-09-15 PROCEDURE — 3075F PR MOST RECENT SYSTOLIC BLOOD PRESS GE 130-139MM HG: ICD-10-PCS | Mod: CPTII,,, | Performed by: NURSE PRACTITIONER

## 2022-09-15 PROCEDURE — 3066F PR DOCUMENTATION OF TREATMENT FOR NEPHROPATHY: ICD-10-PCS | Mod: CPTII,,, | Performed by: NURSE PRACTITIONER

## 2022-09-15 PROCEDURE — 3078F PR MOST RECENT DIASTOLIC BLOOD PRESSURE < 80 MM HG: ICD-10-PCS | Mod: CPTII,,, | Performed by: NURSE PRACTITIONER

## 2022-09-15 PROCEDURE — 3066F NEPHROPATHY DOC TX: CPT | Mod: CPTII,,, | Performed by: NURSE PRACTITIONER

## 2022-09-15 PROCEDURE — 99214 PR OFFICE/OUTPT VISIT, EST, LEVL IV, 30-39 MIN: ICD-10-PCS | Mod: S$PBB,,, | Performed by: NURSE PRACTITIONER

## 2022-09-15 PROCEDURE — 1159F MED LIST DOCD IN RCRD: CPT | Mod: CPTII,,, | Performed by: NURSE PRACTITIONER

## 2022-09-15 PROCEDURE — 4010F PR ACE/ARB THEARPY RXD/TAKEN: ICD-10-PCS | Mod: CPTII,,, | Performed by: NURSE PRACTITIONER

## 2022-09-15 PROCEDURE — 99213 OFFICE O/P EST LOW 20 MIN: CPT | Mod: PBBFAC,PO | Performed by: NURSE PRACTITIONER

## 2022-09-15 PROCEDURE — 3078F DIAST BP <80 MM HG: CPT | Mod: CPTII,,, | Performed by: NURSE PRACTITIONER

## 2022-09-15 PROCEDURE — 3075F SYST BP GE 130 - 139MM HG: CPT | Mod: CPTII,,, | Performed by: NURSE PRACTITIONER

## 2022-09-15 PROCEDURE — 1159F PR MEDICATION LIST DOCUMENTED IN MEDICAL RECORD: ICD-10-PCS | Mod: CPTII,,, | Performed by: NURSE PRACTITIONER

## 2022-09-15 PROCEDURE — 3008F PR BODY MASS INDEX (BMI) DOCUMENTED: ICD-10-PCS | Mod: CPTII,,, | Performed by: NURSE PRACTITIONER

## 2022-09-15 PROCEDURE — 4010F ACE/ARB THERAPY RXD/TAKEN: CPT | Mod: CPTII,,, | Performed by: NURSE PRACTITIONER

## 2022-09-15 PROCEDURE — 3008F BODY MASS INDEX DOCD: CPT | Mod: CPTII,,, | Performed by: NURSE PRACTITIONER

## 2022-09-15 RX ORDER — SERTRALINE HYDROCHLORIDE 100 MG/1
100 TABLET, FILM COATED ORAL DAILY
Qty: 90 TABLET | Refills: 3 | Status: SHIPPED | OUTPATIENT
Start: 2022-09-15 | End: 2023-04-21

## 2022-09-15 NOTE — ASSESSMENT & PLAN NOTE
Congratulated him on reduction of ETOH intake  Reinforced smoking cessation  BP well controlled, LDL well below goal (33) on current dose of statin  Polycythemia resolved   30 day cardiac monitor neg     Continue ASA 81 mg daily
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
## 2022-09-15 NOTE — PATIENT INSTRUCTIONS
Will increase Zoloft to 100 mg daily     Continue Buspar, can take it 3x per day as discussed     Consider sleep apnea testing as we discussed....     Continue aspirin, cholesterol medication for stroke prevention    Repeat the NP testing as ordered --- will follow up after you have this

## 2022-10-07 ENCOUNTER — TELEPHONE (OUTPATIENT)
Dept: NEPHROLOGY | Facility: CLINIC | Age: 57
End: 2022-10-07
Payer: MEDICAID

## 2022-10-07 NOTE — TELEPHONE ENCOUNTER
The message I received regarding patient medical records . Did not leave me a phone number  to call back  because he hung up with out leaving number. Faxed over medical records release form to [facility]

## 2022-10-07 NOTE — TELEPHONE ENCOUNTER
----- Message from Flaca Skinner sent at 10/7/2022  4:13 PM CDT -----  Contact: JR eli Lozada is calling to request patients most recent OV from June, stated has already sent a release of information to medical records but they missed this last OV. If possible can we fax to# [phone]. Thank you.

## 2022-11-01 ENCOUNTER — TELEPHONE (OUTPATIENT)
Dept: CARDIOLOGY | Facility: CLINIC | Age: 57
End: 2022-11-01
Payer: MEDICAID

## 2022-11-01 NOTE — TELEPHONE ENCOUNTER
----- Message from Herman Cotto sent at 11/1/2022 10:13 AM CDT -----  Type:  Sooner Appointment Request    Caller is requesting a sooner appointment.  Caller declined first available appointment listed below.  Caller will not accept being placed on the waitlist and is requesting a message be sent to doctor.    Name of Caller:  Wife/ Jannette Guerra  When is the first available appointment?  Out of Template-- EP-Medicaid  Symptoms:  Annual  Best Call Back Number:  [phone]  Additional Information:  Pt had to cancel today

## 2022-11-09 ENCOUNTER — OFFICE VISIT (OUTPATIENT)
Dept: CARDIOLOGY | Facility: CLINIC | Age: 57
End: 2022-11-09
Payer: MEDICAID

## 2022-11-09 VITALS
HEIGHT: 69 IN | SYSTOLIC BLOOD PRESSURE: 146 MMHG | DIASTOLIC BLOOD PRESSURE: 80 MMHG | WEIGHT: 186.75 LBS | BODY MASS INDEX: 27.66 KG/M2 | HEART RATE: 71 BPM

## 2022-11-09 DIAGNOSIS — Z86.73 CHRONIC ISCHEMIC RIGHT MCA STROKE: ICD-10-CM

## 2022-11-09 DIAGNOSIS — E78.2 MIXED HYPERLIPIDEMIA: ICD-10-CM

## 2022-11-09 DIAGNOSIS — I10 ESSENTIAL HYPERTENSION: Primary | ICD-10-CM

## 2022-11-09 DIAGNOSIS — Z72.0 TOBACCO ABUSE: ICD-10-CM

## 2022-11-09 PROCEDURE — 3066F PR DOCUMENTATION OF TREATMENT FOR NEPHROPATHY: ICD-10-PCS | Mod: CPTII,,, | Performed by: PHYSICIAN ASSISTANT

## 2022-11-09 PROCEDURE — 3008F BODY MASS INDEX DOCD: CPT | Mod: CPTII,,, | Performed by: PHYSICIAN ASSISTANT

## 2022-11-09 PROCEDURE — 99214 OFFICE O/P EST MOD 30 MIN: CPT | Mod: S$PBB,,, | Performed by: PHYSICIAN ASSISTANT

## 2022-11-09 PROCEDURE — 99214 PR OFFICE/OUTPT VISIT, EST, LEVL IV, 30-39 MIN: ICD-10-PCS | Mod: S$PBB,,, | Performed by: PHYSICIAN ASSISTANT

## 2022-11-09 PROCEDURE — 3079F PR MOST RECENT DIASTOLIC BLOOD PRESSURE 80-89 MM HG: ICD-10-PCS | Mod: CPTII,,, | Performed by: PHYSICIAN ASSISTANT

## 2022-11-09 PROCEDURE — 99999 PR PBB SHADOW E&M-EST. PATIENT-LVL II: CPT | Mod: PBBFAC,,, | Performed by: PHYSICIAN ASSISTANT

## 2022-11-09 PROCEDURE — 3008F PR BODY MASS INDEX (BMI) DOCUMENTED: ICD-10-PCS | Mod: CPTII,,, | Performed by: PHYSICIAN ASSISTANT

## 2022-11-09 PROCEDURE — 3077F PR MOST RECENT SYSTOLIC BLOOD PRESSURE >= 140 MM HG: ICD-10-PCS | Mod: CPTII,,, | Performed by: PHYSICIAN ASSISTANT

## 2022-11-09 PROCEDURE — 99212 OFFICE O/P EST SF 10 MIN: CPT | Mod: PBBFAC,PO | Performed by: PHYSICIAN ASSISTANT

## 2022-11-09 PROCEDURE — 4010F PR ACE/ARB THEARPY RXD/TAKEN: ICD-10-PCS | Mod: CPTII,,, | Performed by: PHYSICIAN ASSISTANT

## 2022-11-09 PROCEDURE — 99999 PR PBB SHADOW E&M-EST. PATIENT-LVL II: ICD-10-PCS | Mod: PBBFAC,,, | Performed by: PHYSICIAN ASSISTANT

## 2022-11-09 PROCEDURE — 4010F ACE/ARB THERAPY RXD/TAKEN: CPT | Mod: CPTII,,, | Performed by: PHYSICIAN ASSISTANT

## 2022-11-09 PROCEDURE — 3066F NEPHROPATHY DOC TX: CPT | Mod: CPTII,,, | Performed by: PHYSICIAN ASSISTANT

## 2022-11-09 PROCEDURE — 3079F DIAST BP 80-89 MM HG: CPT | Mod: CPTII,,, | Performed by: PHYSICIAN ASSISTANT

## 2022-11-09 PROCEDURE — 3077F SYST BP >= 140 MM HG: CPT | Mod: CPTII,,, | Performed by: PHYSICIAN ASSISTANT

## 2022-11-09 NOTE — PROGRESS NOTES
"Subjective:    Patient ID:  Palmer Guerra is a 57 y.o. male who presents for follow-up of Hypertension and Hyperlipidemia        HPI  Mr. Guerra is a very pleasant gentleman who follows with Dr. Martinez. He presents today for routine follow up. He is without cardiovascular complaints -- no CP, TAYLOR, or change in his exercise tolerance. He still has some residual deficits from his CVA, but he is walking routinely.     BP normally well controlled. 120s/70s at home.     We reviewed his lipids from March 2022 -- HDL, LDL, and Trig are at goal.     Review of Systems   Constitutional: Negative for chills, diaphoresis, fever, weight gain and weight loss.   HENT:  Negative for sore throat.    Eyes:  Negative for vision loss in left eye, vision loss in right eye and visual disturbance.   Cardiovascular:  Negative for claudication, dyspnea on exertion, leg swelling, near-syncope and syncope.   Respiratory:  Negative for cough, hemoptysis, sputum production and wheezing.    Endocrine: Negative for cold intolerance and heat intolerance.   Hematologic/Lymphatic: Negative for adenopathy. Does not bruise/bleed easily.   Skin:  Negative for rash.   Musculoskeletal:  Negative for falls and muscle weakness.   Gastrointestinal:  Negative for abdominal pain, change in bowel habit, constipation, diarrhea, melena and nausea.   Genitourinary:  Negative for bladder incontinence.   Neurological:  Negative for dizziness, light-headedness, numbness and weakness.   Psychiatric/Behavioral:  Negative for altered mental status.       Vitals:    11/09/22 1547   BP: (!) 146/80   BP Location: Left arm   Patient Position: Sitting   BP Method: Medium (Automatic)   Pulse: 71   Weight: 84.7 kg (186 lb 11.7 oz)   Height: 5' 9" (1.753 m)   Body mass index is 27.58 kg/m².    Objective:    Physical Exam  Constitutional:       Appearance: He is well-developed.   HENT:      Head: Normocephalic and atraumatic.   Eyes:      Extraocular Movements: " Extraocular movements intact.      Pupils: Pupils are equal, round, and reactive to light.   Neck:      Thyroid: No thyromegaly.      Vascular: No JVD.      Trachea: No tracheal deviation.   Cardiovascular:      Rate and Rhythm: Normal rate and regular rhythm.      Chest Wall: PMI is not displaced.      Pulses: Normal pulses and intact distal pulses.      Heart sounds: S1 normal and S2 normal. No murmur heard.    No friction rub. No gallop.   Pulmonary:      Effort: Pulmonary effort is normal. No respiratory distress.      Breath sounds: Normal breath sounds. No wheezing or rales.   Chest:      Chest wall: No tenderness.   Abdominal:      General: Bowel sounds are normal. There is no distension.      Palpations: Abdomen is soft. There is no mass.      Tenderness: There is no abdominal tenderness.   Musculoskeletal:         General: No tenderness. Normal range of motion.      Cervical back: Neck supple.   Skin:     General: Skin is warm and dry.      Findings: No rash.   Neurological:      General: No focal deficit present.      Mental Status: He is alert and oriented to person, place, and time.   Psychiatric:         Mood and Affect: Mood normal.         Behavior: Behavior normal.         Assessment:       Problem List Items Addressed This Visit          Cardiology Problems    Chronic ischemic right MCA stroke    Essential hypertension - Primary    Mixed hyperlipidemia       Other    Tobacco abuse        Plan:       Doing well.   Continue current cardiac medications.   Risk factor modification including diet and exercise.   F/U with Dr. Martinez in 1 year.

## 2022-12-19 PROBLEM — Z86.73 CHRONIC ISCHEMIC RIGHT MCA STROKE: Status: RESOLVED | Noted: 2021-02-02 | Resolved: 2022-12-19

## 2023-03-13 ENCOUNTER — OFFICE VISIT (OUTPATIENT)
Dept: NEUROLOGY | Facility: CLINIC | Age: 58
End: 2023-03-13
Payer: MEDICAID

## 2023-03-13 DIAGNOSIS — R41.89 COGNITIVE AND BEHAVIORAL CHANGES: ICD-10-CM

## 2023-03-13 DIAGNOSIS — F10.11 ALCOHOL ABUSE, IN REMISSION: ICD-10-CM

## 2023-03-13 DIAGNOSIS — R46.89 COGNITIVE AND BEHAVIORAL CHANGES: ICD-10-CM

## 2023-03-13 DIAGNOSIS — F41.9 ANXIETY: ICD-10-CM

## 2023-03-13 PROCEDURE — 90791 PR PSYCHIATRIC DIAGNOSTIC EVALUATION: ICD-10-PCS | Mod: 95,HB,FQ, | Performed by: PSYCHIATRY & NEUROLOGY

## 2023-03-13 PROCEDURE — 90791 PSYCH DIAGNOSTIC EVALUATION: CPT | Mod: 95,HB,FQ, | Performed by: PSYCHIATRY & NEUROLOGY

## 2023-03-13 PROCEDURE — 99499 NO LOS: ICD-10-PCS | Mod: HB,95,, | Performed by: PSYCHIATRY & NEUROLOGY

## 2023-03-13 PROCEDURE — 99499 UNLISTED E&M SERVICE: CPT | Mod: HB,95,, | Performed by: PSYCHIATRY & NEUROLOGY

## 2023-03-20 ENCOUNTER — TELEPHONE (OUTPATIENT)
Dept: NEUROLOGY | Facility: CLINIC | Age: 58
End: 2023-03-20
Payer: MEDICAID
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
## 2023-03-29 ENCOUNTER — OFFICE VISIT (OUTPATIENT)
Dept: NEUROLOGY | Facility: CLINIC | Age: 58
End: 2023-03-29
Payer: MEDICAID

## 2023-03-29 DIAGNOSIS — F10.11 ALCOHOL ABUSE, IN REMISSION: ICD-10-CM

## 2023-03-29 DIAGNOSIS — F32.9 REACTIVE DEPRESSION: ICD-10-CM

## 2023-03-29 DIAGNOSIS — R46.89 COGNITIVE AND BEHAVIORAL CHANGES: ICD-10-CM

## 2023-03-29 DIAGNOSIS — R41.89 COGNITIVE AND BEHAVIORAL CHANGES: ICD-10-CM

## 2023-03-29 DIAGNOSIS — F41.9 ANXIETY: ICD-10-CM

## 2023-03-29 DIAGNOSIS — F03.90 MAJOR NEUROCOGNITIVE DISORDER: ICD-10-CM

## 2023-03-29 PROCEDURE — 96133 PR NEUROPSYCHOLOGIC TEST EVAL SVCS, EA ADDTL HR: ICD-10-PCS | Mod: AH,HB,ICN, | Performed by: PSYCHIATRY & NEUROLOGY

## 2023-03-29 PROCEDURE — 96139 PR PSYCH/NEUROPSYCH TEST ADMIN/SCORING, BY TECH, 2+ TESTS, EA ADDTL 30 MIN: ICD-10-PCS | Mod: AH,HB,ICN, | Performed by: PSYCHIATRY & NEUROLOGY

## 2023-03-29 PROCEDURE — 99499 UNLISTED E&M SERVICE: CPT | Mod: AH,HB,, | Performed by: PSYCHIATRY & NEUROLOGY

## 2023-03-29 PROCEDURE — 99499 NO LOS: ICD-10-PCS | Mod: AH,HB,, | Performed by: PSYCHIATRY & NEUROLOGY

## 2023-03-29 PROCEDURE — 96138 PR PSYCH/NEUROPSYCH TEST ADMIN/SCORING, BY TECH, 2+ TESTS, 1ST 30 MIN: ICD-10-PCS | Mod: AH,HB,ICN, | Performed by: PSYCHIATRY & NEUROLOGY

## 2023-03-29 PROCEDURE — 96139 PSYCL/NRPSYC TST TECH EA: CPT | Mod: AH,HB,ICN, | Performed by: PSYCHIATRY & NEUROLOGY

## 2023-03-29 PROCEDURE — 96138 PSYCL/NRPSYC TECH 1ST: CPT | Mod: AH,HB,ICN, | Performed by: PSYCHIATRY & NEUROLOGY

## 2023-03-29 PROCEDURE — 96132 NRPSYC TST EVAL PHYS/QHP 1ST: CPT | Mod: AH,HB,ICN, | Performed by: PSYCHIATRY & NEUROLOGY

## 2023-03-29 PROCEDURE — 96132 PR NEUROPSYCHOLOGIC TEST EVAL SVCS, 1ST HR: ICD-10-PCS | Mod: AH,HB,ICN, | Performed by: PSYCHIATRY & NEUROLOGY

## 2023-03-29 PROCEDURE — 96133 NRPSYC TST EVAL PHYS/QHP EA: CPT | Mod: AH,HB,ICN, | Performed by: PSYCHIATRY & NEUROLOGY

## 2023-03-29 NOTE — Clinical Note
Jimbo Beavers,  I'm not sure what to make of Mr. Guerra, and his testing is unusable. But, his wife's report is pretty convincing of progression and deficits impacting ADLs. What are your thoughts about more intensive non-neuropsych workup for things like FTD or early onset AD?  Sergio, Ally

## 2023-03-29 NOTE — PROGRESS NOTES
NEUROPSYCHOLOGY CONSULT    Referral Information  Name: Palmer Guerra  MRN: [id number]  : 1965  Age: 57 y.o.  Race: White  Gender: male  Dominant Hand: Left  Referring Provider: Ruthann Mejia Np  [address] [facility] [address]  [address] [place] [address]  Billing: See below for details as coding/billing has changed   Referral Reason/Medical Necessity: Neuropsychological re-evaluation to assess current cognitive functioning, aid in differential diagnosis, and provide treatment recommendations in the context of reported ongoing cognitive concerns despite lifestyle changes.  Consent/Emergency Plan: The patient expressed an understanding of the purpose of the evaluation and consented to all procedures. I informed the patient of limits to confidentiality and discussed an emergency plan.    SUMMARY/TREATMENT PLAN   Results from the interview indicate the following diagnoses and treatment plan recommendations. The patient does not have the ability to follow a treatment plan without help from family.    Diagnoses/Plan:  Problem List Items Addressed This Visit          Neuro    Major neurocognitive disorder       Psychiatric    Depression    Alcohol abuse, in remission    Anxiety     Summary/Conclusions:  Mr. Guerra and his family reported cognitive and behavioral change following a series of strokes (early and late subacute right MCA and MCA-PCA watershed territory infarcts in 2021). Initial assessment suggested difficulty in the context of reduced performance validity, and repeat assessment was recommended. They reported ongoing cognitive changes that have been gradually progressive (worse in the evening); he continues to report ongoing anxiety/ruminative thoughts. They noted improvement in mood lability and agitation and abstinence from alcohol use since 2022. They reported ongoing physical symptoms (e.g., balance difficulty, choking when swallowing, visual perception difficulty). He is dependent  in instrumental activities of daily living and has significant apathy that impacts his willingness to engage in/initiation of hygiene tasks.     Neuropsychological assessment results were interpreted in the context of estimated average premorbid abilities and consistently reduced performance validity. Within that context, he demonstrated at least intact naming, copy of a clock, interference on a response inhibition task, and recall of a word list. All other scores were below expectation. He endorsed moderate depression and severe anxiety on self-report measures. Comparisons to prior testing could not be made reliably due to reduced performance validity during both assessment events; however, most scores remained generally consistent. He appeared to have more slowing on timed tasks during current testing.    In sum, Mr. Guerra continued to demonstrate broad impairments on neuropsychological assessment, with reduced ability to reliably interpret test performance due to poor performance validity across tasks. He and his family reported improvement in mood lability and agitation with abstinence from alcohol since November 2022, but he continues to report ongoing racing thoughts and anxiety. His wife noted increased physical changes and need for assistance with activities of daily living. Despite limitations in interpreting neuropsychological information, other data, including imaging, EEG, and collateral reports suggest cognitive impairment consistent with a major neurocognitive disorder diagnosis; however, his current presentation is inconsistent with existing potential etiologies including stroke and alcohol use. Continued monitoring, abstinence from alcohol, and treatment of reported anxiety and depressed mood or strongly recommended.    Recommendations:   Medical Follow-Up: Continue usual follow up for current active medical issues. Consider additional workup regarding etiology of reported symptoms (e.g., CSF  biomarkers, functional imaging).  Other Relevant Orders/Issues:   Rehabilitation: Referral for Physical Therapy, Occupational Therapy, and Speech/Language Therapy is recommended to address ongoing physical and cognitive symptoms.  Sleep Medicine: Consultation with Sleep Medicine is recommended to assess current sleep quality, rule out a sleep-related disorder, and provide recommendations for treatment.  Behavioral Health: Referral to Behavioral Health is recommended for evaluation, medication management, and/or individual therapy to address significant apathy and ongoing reported anxiety and depressed mood. Mr. Guerra may call the [facility] office at [phone], for additional information or to schedule an appointment.    Care Management Recommendation: Care partners can contact the Dementia Caregiver Support Program ([email or web address] to learn more about [email or web address] Dementia Education Series via Zoom; Dementia Caregiver Support Group via Zoom; or volunteer-led in-person Dementia Caregiver Support Group. If you have any questions, please contact [email or web address] or call [phone].     Behavioral/Neuropsychiatric Symptoms: We will discuss various caregiver-implemented behavioral strategies (Redirection, Reassurance, Distraction, Identifying Triggers) during the follow-up visit to address apathy, anxiety, and depressed mood.     Driving: Based on these findings it is recommended that Mr. Guerra continue to abstain from driving.    Supervision/Monitoring: Will review supervision recommendations with family during our follow-up visit.     Recommendations for Mr. Guerra:  Cognitive Recommendations:  Repetition, structure and routine are known to help with cognitive difficulties and can maximize current functioning. Therefore, it is recommended that Mr. Guerra establish set routines for activities including medication administration, eating, household chores, and  "errands. This repetition and routine will reduce cognitive demands and associated confusion.  Designate a specific place in the home (e.g., a "memory table" or a memory bowl) for easily lost items such as a wallet, glasses, or keys. This can help to reduce the frequency with which such items are misplaced and the frustration with being unable to locate lost items.  Make sure that important information is not communicated in a distracting environment. For example, if important information is being conveyed, make sure it is done in an environment free of noise or distracting activity such that he cannot fully attend to the information.   Dont attempt to multi-task.  Separate tasks so that each can be completed one at a time.  Break down large projects into smaller tasks and write down the steps to completing the task.    Allowing sufficient time to complete tasks will reduce frustration and help to ensure completion.  Sleep Hygiene: Poor sleep has a negative effect on cognition. Several strategies have been shown to improve sleep:   Caffeine intake in the afternoon and evening, as well as stuffing oneself at supper, can decrease the quality of restful sleep throughout the night.   Bedtime and wake-up times should be consistent every night and morning so the body becomes used to a single routine, even on the weekends.  Engage in daily physical activity, but not 2-3 hours before bedtime.   No technology use (television, computer, iPad) 1-2 hours before bed.   Have a wind down routine (e.g., soft lights in the house, bath before bed, reduced fluid intake, songs, reading, less noise) to promote sleep readiness.   Visit the www.sleepfoundation.org for more strategies.   Resources: Consider resources for support through the Governors Office of Elderly Affairs (http://goea.[place].gov/), [place] Chapter of the Alzheimers Association (www.alz.org/[place]/), the Family Caregiver [place] (www.caregiver.org), the " American Psychological Association (http://www.apa.org/pi/about/publications/caregivers/consumers/index.aspxconsumers/index.aspx), and the [facility] (www.coastseniors.org).    Practice good cognitive/brain health hygiene:  Engage in regular exercise, which increases alertness and arousal and can improve attention and focus.    Get a good nights sleep, as this can enhance alertness and cognition.  Eat healthy foods and balanced meals. It is notable that research indicates certain nutrients may aid in brain function, such as B vitamins (especially B6, B12, and folic acid), antioxidants (such as vitamins C and E, and beta carotene), and Omega-3 fatty acids. Talk with your physician or nutritionist about whats right for you.   Keep your brain active. Find activities to stay mentally active, such as reading, games (cards, checkers), puzzles (crosswords, Sudoku, jig saw), crafts (models, woodworking), gardening, or participating in activities in the community.  Stay socially engaged. Continue staying active with your family and friends.    Managing Vascular Risk Factors: A personal history of disorders that affect the cardiovascular system (e.g., hypertension, hyperlipidemia, diabetes) can have a negative impact on brain functioning especially over many years. Therefore, it is very important for Mr. Guerra to maintain good control over risk factors. The following is recommended:  Take all medications as prescribed and follow-up with recommendations above.  Get regular physical exercise to the extent that it is possible.   Follow a Mediterranean diet, which emphasizes plant-based foods, whole grains, fish and healthy fats, such as olive oil, and has been shown to be brain protective.   Check blood pressure, cholesterol levels, blood sugar, and others as appropriate.    Prepare for the future: Mr. Guerra and caregivers should consider formal arrangements to allow a designated person to make medical  and financial decisions for Mr. Guerra, should he become unable to do so.  Options to consider include designating a healthcare proxy, medical and/or financial power of , and completing advanced directives for healthcare decisions and estate planning (e.g., finalizing a will).  If cost is prohibitive, [facility] (https://[facility].org/) provides free  for individuals with low income.     Recommendations: Consider stress management techniques to reduce anxiety and respond to anxiety as it arises. Heres a simple three-minute exercise you can use no matter where you are:  1. Sit or stand up nice and tall. Let your eyes relax. Relax your jaw. Let your shoulders relax down your back and start to focus your attention on your breath.  2. Breathe all the way in through your nose, filling your belly with your breath. Then, breathe all the way out through your nose. Its that simple.  3. Start to breathe into a count of three. Inhale for one, two, three. Then, exhale into a count of six: six, five, four, three, two and one.  4. Repeat. Inhale: one, two, three. Exhale: six, five, four, three, two and one.  5. Continue just like this for three minutes, or as long as you'd like. You can set a timer on your phone at the beginning of your practice.    Should your mind wander, simply notice, without judgment. Observe your thought. And let it go. Return your breath to your attention as many times as it takes, training your mind in the same way we train our bodies.    https://blog.[facility].org/articles/3-minute-mindful-breathing-exercise-for-anxiety-relief    https://blog.[facility].org/articles/shake-it-off-in-the-new-year-simple-stress-relief-techniques     Thank you for allowing me to participate in Mr. Guerra' care.  If you have any questions, please contact me at [phone].    Ally Ch, Ph.D., ABPP  Board Certified in Clinical Neuropsychology  [facility]  Neurology    HISTORY OF PRESENT ILLNESS AND CURRENT SYMPTOMS     Mr. Guerra completed an initial interview via telehealth on 3/13/2023. The background information is taken from that interview, with updates.    Mr. Guerra has active problems noted below. He completed an initial evaluation in August 2021, which suggested possible deficits that could not be reliably interpreted due to reduced performance validity. Since that time, they reported improvement overall with medication adjustment but ongoing anxiety/mind racing, trouble sleeping, reduced alcohol use, and ongoing visual deficits (+ L hemianopsia with neglect).    8/24/2021 note: Mr. Palmer Guerra is a 57 y.o., male with 12 years of education who was referred for a neuropsychological evaluation due to cognitive difficulties in the setting of early and late subacute right MCA and MCA-PCA watershed territory infarcts with areas of developing cortical laminar necrosis, discovered in January 2021, two and a half weeks after they moved into their new home. Mr. Guerra has been following with neurology since March 2021 due to strokes. Performance on a brief mental status screening measure was reduced (Cl[place]ton Cognitive Assessment [MoCA]=14/30). His physical functioning has improved (some residual finger weakness in his left hand), but his cognitive symptoms have not. This report has been updated since the original intake appointment (8/16/21) and any new information is listed within.     Mr. Guerra has active problems noted below.    Cognitive Symptoms:  Type/Examples:   Attention: Forgets that he is watching a show; loses interest. They reported stability over time.   Mental Speed: Latency in responding to others/questions is slow. He denied changes. His wife reported cognitive speed is slower in the evenings.   Memory: Prior to his strokes being identified, he was forgetting his ice chest several times on the way to work and losing his glasses/phone  "constantly. 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 Got lost in a parking lot one week prior to stroke. Wife reported that, post-stroke, while she was with her son in the hospital (July 2021) he asked her every day how [place] was (she was not in [place]). His memory difficulty has worsened since the stroke. They reported stability in memory over time, with greater difficulty at night.  Language:  No problems reported. He said his mind goes so fast, his words cannot keep up. His wife said he does not speak quickly and does not talk a lot at all. When you ask him a question, he may not answer. He said he is thinking about the information and trying to think of what to say. Talking makes him tired.  Visuospatial/Perceptual: Having trouble with his vision since stroke was identified (See "Sensory" for further account of vision difficulty). He regularly misplaces his cup, even when it is in front of him. His wife said he is legally blind now and does not have peripheral vision.  Executive Functioning: Difficulty with multitasking beginning a couple weeks before stroke. They reported this is stable.  Onset/Course: Sudden onset a couple of weeks prior to stroke identification, with worsening in symptoms acutely after stroke identification. Symptoms seem to come in cycles (get worse and then a little better). His home health therapist contacted wife that he was regressing in his cognition.   His wife said his medications are stabilized, and he is doing better but has confusion sometimes, particularly in unfamiliar places or at night.    Current Functional Status/Needs:  ADLs  Self-Care Eating Safety Other   His wife said she gets excited when he takes a shower, about once every 6 weeks. He said he is concerned about falling. He does not see a need to brush his teeth. He does it independently, when he does. He " "said he chokes when he is eating, which has been going on for awhile but has not been addressed recently. His wife said he has not wandered on the property since December 2022. Wandering "cycles," according to his wife. He visits others on a schedule to reduce the interest in wandering.      Instrumental IADLs:   Driving Medications/Health Appts Household Finances   He is not driving.   Wife responsible for all medications; he missed two mornings recently. He had been doing well for awhile before then. Limited; he sweeps the floor and takes out the trash. He helps to clean the cat litter. He is no longer cooking or using a lawnmower. Wife has always handled finances       Psychiatric/Behavioral Symptoms:  Mood:  Depression/Dysphoria Anxiety/Fearfulness Irritability   None reported    He reported his mind is constantly thinking in an anxious manner. His wife said when he feels confused, he appears more anxious. This has improved over the last two months. He feels frustrated with himself if he cannot complete activities.      Behavior:  Agitation/Resistance Delusions/Paranoia Hallucinations   None reported None reported; he said he is learning to accept that he cannot do what he used to do. The other day, he thought he saw his wife in a chair on the porch, but she was in the house. One night, he thought he saw a bear. This is only at night over the last approximately 4-6 months.     Apathy/Motivation Repetitive/Restlessness Impulsivity/Compulsivity   Significant apathy and loss of motivation. He speaks very minimally. None reported  They reported less impulsivity. He tells his wife before he goes for a walk.     Neurovegetative:  Sleep/Nighttime  Appetite/Diet  Energy   He reported his sleep has been "good." His wife said he snores and is a restless sleeper. He acts out his dreams "at least once a week." He will think he was awake and he wasn't; he uses a Fitbit to show him that he was sleeping. He has not had a sleep " "study because he is not interested in wearing a machine.  He still has a sweet tooth. When he is confused, he may refuse to eat.   Fatigues easily (physically and mentally); talking tires him out.      Physical Symptoms: They reported changes in balance and coordination. He will fall over if he is on his hands and knees; he feels dizzy when going "up and down." His gait is off-balance. He is interested in physical therapy.  Autonomic Dysfunction: Since stroke gets easily overheated (possible heat stroke in 2012 but did not seek care)  Sensory: Saw Dr. Thomas (4/1/21): Mr. Guerra had a full field to his left based on confrontation testing. This would correspond with the location of his strokes away from the visual pathways. Confrontation testing revealed probable loss of visual field down and to his right in both eyes. I found no ocular misalignment to explain diplopia. Both eyes themselves appeared healthy. He will return to me as requested. He is legally blind. No hearing or vision changes recently.    PERTINENT BACKGROUND INFORMATION   SOCIAL HISTORY  Family Status:  to wife since 1998; three children   Current Living Situation: Wife and three boys in the home; they have chickens outside  Primary Source of Support: Wife  Daily Activities: He does not engage in many activities; in the last three weeks, he has been going with his wife to her volunteer position  Stressors: financial concerns  Other Factors:  Educational Level: 12 years ed., graduated high school  Occupational Status and History: Currently on long term-disability; , loads petroleum products on barges, through pipe lines etc.   Other:    Family History   Problem Relation Age of Onset    Hypertension Mother     Macular degeneration Mother     Hypertension Maternal Grandmother     Hearing loss Maternal Grandmother     Cataracts Maternal Grandmother     Glaucoma Neg Hx     Retinal detachment Neg Hx      Family Neurologic History: " "Maternal grandmother may have had dementia prior to death  Family Psychiatric History: His mother has anxiety and panic attacks    Updated/Relevant Psychiatric History: They reported concern for physical and verbal aggression in the past. They no longer have concerns. He said it takes him longer to process and accept information, so his mood is variable until "I finally get myself together."    Suicidal/Homicidal Ideation: He had some suicidal ideation in the past. They are not concerned about suicidal ideation. They have a safe for medications/bullets and a locked closet for the guns.    Substance Use: Has been drinking alcohol since around 13 years of age, daily intake around 12 per day of beer (per record review). He was abstinent while in the hospital post-stroke and started again when his son was in the hospital in 2021. He stopped drinking alcohol in November 2022, and it has been "going real good." His wife has been going to [employer], and he has been to Alcoholics Anonymous. Other family members also participate. No drug use reported.     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     Past Medical History:   Diagnosis Date    CVA (cerebral vascular accident) 2/2/2021    Depression     ED (erectile dysfunction)     Hypertension     Stroke     2/2/20     Past Surgical History:   Procedure Laterality Date    TONSILLECTOMY       Updated/Relevant Neurologic History:  Falls: None, but he is very fearful of falling  Headaches/Migraines: None  TBI: None   Seizures: None  Stroke: CVA (January/February 2021):   Movement Concerns: None  Prior " Neuropsychological Assessment:   Tests Administered: MSVT, ACS WC, Test of Premorbid Functioning (TOPF); Wechsler Adult Intelligence Scale, Fourth Edition (WAIS-IV) [select subtests]; Repeatable Battery for the Assessment of Neuropsychological Status (RBANS, form A); Trail Making Test, parts A and B (Mikaela et al., 2004 norms); Stroop Color and Word Task; Frontal Assessment Battery; Clock Drawing; Grooved Pegboard Test; Hernandez Depression Inventory - second edition (BDI-2);and Generalized Anxiety Disorder - 7 Item Scale (MARTIN-7). Manual norms were used unless otherwise indicated.  Results/Conclusions: Mr. Guerra' neuropsychological testing was confounded by reduced performance validity, and therefore his test data cannot reliably be interpreted. In the context of reduced performance and estimated average pre-morbid abilities (by demographics), his performance on tasks at or near-expectation can be interpreted as a minimum level of functioning. With that said, Mr. Guerra performed near-expectation on confrontation naming, aspects of delayed memory for verbal information, visual recognition, a task of simple attention, a task of working memory, and response inhibition. On self-report mood inventories, Mr. Guerra' responses on depression and anxiety scales indicated significant depressive and anxious symptomatology. Invalidation of the current test data precludes provision of a cognitive diagnosis, however Mr. Guerra and his wife reported an onset of cognitive difficulty coinciding with infarcts on imaging and slowing on EEG. There are additional factors which are considered to exacerbate Mr. Guerra' current reported cognitive difficulties, including his significant intake of alcohol and past alcohol use history, improper management of medications, poor sleep, and severe reported levels of depression and anxiety. It would be beneficial to improve the aforementioned factors and complete follow-up testing in 6-12 months  "to attempt to re-classify his strengths and weaknesses. Recommendations for Mr. Guerra' continued care and well-being are provided below.   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 (cramping, difficulty walking, took a week to recover, no medical treatment)    Recent Labs  Lab Results   Component Value Date    [id number] 308 10/07/2021     No results found for: RPR  Lab Results   Component Value Date    FOLATE 11.7 10/07/2021     Lab Results   Component Value Date    TSH 2.650 02/02/2021     Lab Results   Component Value Date    HGBA1C 5.3 01/28/2021     No results found for: HIV1X2, [id number]    Imaging    Results for orders placed or performed during the hospital encounter of 03/31/21   MRI Brain W WO Contrast    Narrative    EXAMINATION:  MRI BRAIN W WO CONTRAST    CLINICAL HISTORY:  Stroke, follow up; hx strokes, dizziness, near syncopal episode witnessed by family and blank stare for a few minutes, denies trauma, surg, MS, CA.  Stroke versus seizure--does not have a hx of seizures    TECHNIQUE:  Multiplanar multisequence MR imaging of the brain was performed before and after the administration of 14 mL Clariscan intravenous contrast.    COMPARISON:  MRI brain with without contrast, 02/02/2021.  MRA brain without contrast, 02/02/2021. CT head without contrast, 01/29/2021.    FINDINGS:  INTRACRANIAL: Evolving multiple now chronic right basal ganglia, corona radiata and centrum semiovale infarcts and encephalomalacia as well as right temporal and parietal cortical infarcts.  The previously seen enhancement associated with these areas has resolved.  Faint residual increased signal on DWI with normal signal ADC along the margins of some of the corona radiata infarcts.  FLAIR signal abnormality corresponding to the lesions along the periphery with volume loss centrally.  Additional scattered T2 FLAIR hyperintense white matter foci are " unchanged, likely related to chronic microvascular ischemic change.  Tiny focus of susceptibility in the right parietal lobe is unchanged from prior study.  Hippocampal formations are symmetric and normal in size and signal.  No extra-axial fluid collection or mass.  No intracranial mass effect.  No hydrocephalus.  6 mm pineal cyst.  Otherwise, midline structures have a normal configuration.  Visualized pituitary gland and infundibulum are normal.  At least moderate stenosis of the distal right M1 MCA segment with decreased caliber of the distal MCA branches relative to the contralateral side.  Note this was occluded on prior MRA.    SINUSES: Mild bilateral ethmoid sinus mucosal thickening.  Trace left mastoid fluid.    ORBITS: Visualized orbits are normal.      Impression    1. Continued evolution of right sided corona radiata, centrum semiovale and temporal and parietal cortical infarcts with increased encephalomalacia and resolution of previously seen enhancement and near complete resolution of restricted diffusion.  2. Persistent at least moderate stenosis of the distal right M1 MCA segment with decreased caliber of the distal right MCA branches.  3. No evidence of hippocampal sclerosis      Electronically signed by: Aguilar Gibbs MD  Date:    03/31/2021  Time:    14:24   Results for orders placed or performed during the hospital encounter of 02/02/21   MRA Brain    Narrative    EXAMINATION:  MRA BRAIN WITHOUT CONTRAST    CLINICAL HISTORY:  Stroke, follow up;    TECHNIQUE:  Non-contrast 3-D time-of-flight intracranial MR angiography was performed through the [place] with MIP reformatting.    COMPARISON:  MRI of the brain 02/02/2021    FINDINGS:  Anterior circulation: The petrous and the cavernous internal carotid arteries bilaterally are patent without any filling defects or any aneurysmal dilatation.    There is a complete occlusion of the horizontal segment of the right middle cerebral artery with  nonvisualization of the distal M1 as well as the M2 segments of the right middle cerebral artery territory.  The A1 segment of the right anterior cerebral artery and the A2 segment of the anterior cerebral artery are widely patent.    The left middle cerebral artery and the anterior cerebral artery demonstrate no occlusive disease or any filling defects or dissections.  No aneurysms of the left MCA trifurcation or the anterior communicating artery.  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  Small caliber right posterior communicating artery    In the posterior circulation a dominant left vertebral artery.  No occlusive disease of the intracranial vertebral arteries or the basilar artery.  No posterior fossa aneurysms identified.  The superior cerebellar arteries and the posteroinferior cerebellar arteries demonstrate no significant abnormalities.      Impression    1. 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  2. MRA of the [id number] otherwise is unremarkable.  This report was flagged in Epic as abnormal.      Electronically signed by: Reuben Sandoval MD  Date:    02/03/2021  Time:    07:54   Results for orders placed or performed during the hospital encounter of 01/29/21   CT Head Without Contrast    Narrative    EXAMINATION:  CT HEAD WITHOUT CONTRAST    CLINICAL HISTORY:  Altered mental status; Disorientation, unspecified    TECHNIQUE:  Routine unenhanced axial images were obtained through the head.  Sagittal and coronal reformatted images were created.  The study is reviewed in bone and soft tissue windows.    COMPARISON:  None    FINDINGS:  Intracranial contents: There is no prior study for comparison.  There is no intracranial  hemorrhage.  There is no hydrocephalus or midline shift.  There is, however, patchy hypodensity in the right frontal subcortical white matter as well as in the right corona radiata and extending more cephalad into the right parietal subcortical white matter.  The findings are nonspecific and age indeterminate but may reflect recent nonhemorrhagic ischemic disease.  Clinical correlation is needed.  The gray-white interface is otherwise preserved.  There is no abnormal extra-axial fluid collection.  The basilar cisterns are open.  The cerebellar tonsils are normal position.    Extracranial contents, calvarium, soft tissues: The calvarium is normal.  The paranasal sinuses and mastoid air cells are clear.      Impression    1. There is no hemorrhage, hydrocephalus or midline shift but there is nonspecific and age indeterminate patchy white matter hypodensity in the right frontal and parietal subcortical white matter as well as in the right corona radiata.  These findings may reflect recent nonhemorrhagic ischemic disease which could be further evaluated with MRI.  This report was flagged in Epic as abnormal.      Electronically signed by: Angel Tierney MD  Date:    01/29/2021  Time:    16:48     EEG (4/9/21):   Findings:The short-term video EEG recording during wakefulness contains 8Hz alpha activity over the posterior head regions. There is focal right hemispheric 3-6 Hz delta to theta slowing, maximal frontotemporal and to a lesser degree centroparietal. No abnormal activity occurred during photic stimulation. Hyperventilation was not performed. During the recording, the patient became drowsy but did not fall asleep.     Impression: The EKG channel was unremarkable. The short-term video EEG shows focal right hemispheric slowing, maximal right frontotemporal. This is indicative of focal disturbance of cerebral function.  No potentially epileptiform activity was seen.     Current Outpatient Medications:      "amLODIPine (NORVASC) 10 MG tablet, Take 1 tablet (10 mg total) by mouth once daily., Disp: 30 tablet, Rfl: 11    aspirin (ECOTRIN) 81 MG EC tablet, Take 1 tablet (81 mg total) by mouth once daily., Disp: 30 tablet, Rfl: 3    atorvastatin (LIPITOR) 40 MG tablet, TAKE 1 TABLET BY MOUTH ONCE DAILY IN THE EVENING, Disp: 90 tablet, Rfl: 1    busPIRone (BUSPAR) 15 MG tablet, Take 1 tablet by mouth three times daily as needed, Disp: 40 tablet, Rfl: 0    cetirizine (ZYRTEC) 10 MG tablet, Take 10 mg by mouth once daily., Disp: , Rfl:     doxepin (SINEQUAN) 10 MG capsule, Take 1 capsule (10 mg total) by mouth every evening., Disp: 30 capsule, Rfl: 11    multivit-min-folic-vit K-lycop (ONE-A-DAY MEN'S 50 PLUS) 400- mcg Tab, Take 1 tablet by mouth once daily., Disp: , Rfl:     sertraline (ZOLOFT) 100 MG tablet, Take 1 tablet (100 mg total) by mouth once daily., Disp: 90 tablet, Rfl: 3    sildenafiL (VIAGRA) 100 MG tablet, Take 100 mg by mouth daily as needed for Erectile Dysfunction., Disp: , Rfl:   No current facility-administered medications for this visit.    Facility-Administered Medications Ordered in Other Visits:     neomycin-bacitracin-polymyxin packet, , , PRN, Erich Larson MD, 1 packet at 04/10/14 0957    MENTAL STATUS AND OBSERVATIONS:  APPEARANCE: Casually and appropriately dressed and groomed.  He wore glasses.  ALERTNESS/ORIENTATION: Attentive and alert. Oriented to month, year, and day of the week but not date; fully oriented to location  GAIT: Unremarkable  MOTOR MOVEMENTS/MANNERISMS: Unremarkable  SPEECH/LANGUAGE: Normal in rate, rhythm, tone, and volume; did not engage in spontaneous conversation. No significant word finding difficulty noted. Receptive and expressive language were within normal limits.  STATED MOOD/AFFECT: The patients stated mood was "good." Affect was consistent with the interview topic.  INTERPERSONAL BEHAVIOR: Rapport was quickly established   SUICIDALITY/HOMICIDALITY: " "Denied  HALLUCINATIONS/DELUSIONS: None evidenced or endorsed  THOUGHT PROCESSES/INSIGHT: Thoughts seemed logical and goal-directed.     TEST TAKING BEHAVIOR and VALIDITY: Mr. Guerra was cooperative with test procedures but stated he had not slept well the night before testing. He made remarks when presented with tasks (e.g., "Oh, man") and noted that his mind is "always racing." He worked slowly and exhibited long response latencies. Sometimes, he required reminders about test instructions and would ask questions about items when they could not be repeated. Scores on stand-alone and embedded performance validity measures were all outside of expectation. The current results, therefore, are likely an inaccurate reflection of the patient's current functioning and can be considered, at best, a minimal estimate.    PROCEDURES/TESTS ADMINISTERED:  In addition to performing a review of pertinent medical records, reviewing limits to confidentiality, conducting a clinical interview, and explaining procedures, the following measures were administered: MSVT, ACS WC, Test of Premorbid Functioning (TOPF); Wechsler Adult Intelligence Scale, Fourth Edition (WAIS-IV) [select subtests]; Repeatable Battery for the Assessment of Neuropsychological Status (RBANS, form A); Trail Making Test, parts A and B (Mikaela et al., 2004 norms); Stroop Color and Word Task; Frontal Assessment Battery; Clock Drawing; Grooved Pegboard Test; Hernandez Depression Inventory - second edition (BDI-2);and Generalized Anxiety Disorder - 7 Item Scale (MARTIN-7). Manual norms were used unless otherwise indicated.     TEST RESULTS      Raw Score Standardized Score Percentile/CP   MSVT IR 50 - -   MSVT DR 55 - -   MSVT Cons 75 - -   MSVT PA 50 - -   MSVT FR 35 - -   Dot Counting Escore 33 - -   ACS RDS 5 - -   CITH 5 - -   RBANS EI 5 -    INTELLECTUAL FUNCTIONING Raw Score Standardized Score Percentile/CP   WAIS-IV      WMI - 63 1   COGNITIVE SCREENING Raw Score " Standardized Score Percentile/CP   RBANS      Immediate Memory - 44 0.1   VS/Construction - 58 0.3   Language - 79 8   Attention - 43 0.1   Delayed Memory - 56 0.2   Total Scale - 50 0.1   EI 5 - -   LANGUAGE FUNCTIONING Raw Score Standardized Score Percentile/CP   RBANS Naming 9 - 17-25   RBANS Semantic Fluency 10 3 1   VISUOSPATIAL FUNCTIONING Raw Score Standardized Score Percentile/CP   RBANS Line Orientation  9 - <2   RBANS Figure Copy 11 1 0   RCFT Copy 14.5 - <1   RCFT Time to Copy 190 - >16   Clock Request 1 - <1.7   Clock Copy 4 - 10   Clock Total 5 - <1.7   LEARNING & MEMORY Raw Score Standardized Score Percentile/CP   RBANS      List Learning 14 2 0   List Recall 5 - 26-50   List Recognition 14 - <2   Story Memory 4 1 0.1   Story Recall 2 2 0.4   Story Recognition  4 - <1   Figure Recall 6 4 2   Figure Recognition 1 - -   ATTENTION/WORKING MEMORY Raw Score Standardized Score Percentile/CP   WAIS-IV Digit Span 13 3 1         DS Forward 5 3 1         DS Backward 4 5 5         DS Sequence 4 5 5         Longest Digit Forward 4 - -         Longest Digit Backward 3 - -         Longest Digit Sequence 3 - -         RDS 5 - -   WAIS-IV Arithmetic 7 4 2   RBANS Digit Span  4 2 0   MENTAL PROCESSING SPEED Raw Score Standardized Score Percentile/CP   RBANS Coding 6 1 0   TMT A  192 14 <0.1   TMT A errors 0 - -   DCT 33 - -   EXECUTIVE FUNCTIONING Raw Score Standardized Score Percentile/CP   TMT B d/c #N/A #N/A   TMT B errors 4 - -   Stroop: C/W 7 22 0.3   Stroop: Interference -6 44 27   AKIKO 11 -5.4 <1   WAIS-IV Matrix Reasoning 3 2 0.4   FRONTOMOTOR  Raw Score Standardized Score Percentile/CP   GPT  15 <0.1   GPD  20 0.1   MOOD & PERSONALITY Raw Score Standardized Score Percentile/CP   BDI-2 24 - -   MARTIN-7 20 - -     TESTING SUMMARY: Mr. Montemayor premorbid intellectual abilities were estimated to be in the average range during previous testing. Performance overall on a brief cognitive measure was reduced,  with reduced performance across index scores. Basic auditory attention when repeating digits was reduced on two tasks, with reduced working memory when reversing and sequencing digits and when solving mental arithmetic problems. Basic visuomotor processing speed was reduced, with reduced coding. He was unable to complete a set shifting task. Interference on a response inhibition task was intact despite slowed speed. Performance was reduced on a measure of nonverbal abstract reasoning and on a measure including verbal abstract reasoning, phonemic fluency, and motor programming. Semantic fluency was reduced. He demonstrated at least low average confrontation naming on a brief measure. On visual tasks, he demonstrated difficulty with copying of a moderately complex figure and a complex figure. Identification of the spatial rotation of lines was reduced. He demonstrated reduced conceptualization of a clock but intact copy. Fine motor speed and dexterity were reduced bilaterally, with slower performance using his dominant, left hand. Learning of a word list was reduced, with at least average retention but reduced recognition. Learning and recall of a story were reduced, with reduced recognition. Figure recall was reduced, with intact recognition. He endorsed moderate depression and severe anxiety on self-report measures. Direct comparisons to testing in 2021 could not be made, due to performance validity concerns during both testing sessions. Many scores were generally consistent, with slower performance on timed tasks.    BILLING  Service Description CPT Code Minutes Units   Test Evaluation Services --  --   Neuropsychological testing evaluation services by physician [phone]   Each additional hour by physician [phone]  2   Test Administration and Scoring --  --   Psychological or neuropsychological test administration and scoring by physician [id number]  0   Each additional 30 minutes by physician [id number]  0   Psychological  or neuropsychological test administration and scoring by technician [phone]   Each additional 30 minutes by technician [id number]  5

## 2023-04-03 PROBLEM — F03.90 MAJOR NEUROCOGNITIVE DISORDER: Status: ACTIVE | Noted: 2021-08-21

## 2023-04-05 ENCOUNTER — OFFICE VISIT (OUTPATIENT)
Dept: NEUROLOGY | Facility: CLINIC | Age: 58
End: 2023-04-05
Payer: MEDICAID

## 2023-04-05 ENCOUNTER — TELEPHONE (OUTPATIENT)
Dept: NEUROLOGY | Facility: CLINIC | Age: 58
End: 2023-04-05
Payer: MEDICAID

## 2023-04-05 DIAGNOSIS — F03.90 MAJOR NEUROCOGNITIVE DISORDER: ICD-10-CM

## 2023-04-05 DIAGNOSIS — F41.9 ANXIETY: ICD-10-CM

## 2023-04-05 DIAGNOSIS — F10.11 ALCOHOL ABUSE, IN REMISSION: ICD-10-CM

## 2023-04-05 DIAGNOSIS — F32.9 REACTIVE DEPRESSION: ICD-10-CM

## 2023-04-05 PROCEDURE — 99499 NO LOS: ICD-10-PCS | Mod: S$PBB,HB,, | Performed by: PSYCHIATRY & NEUROLOGY

## 2023-04-05 PROCEDURE — 99499 UNLISTED E&M SERVICE: CPT | Mod: S$PBB,HB,, | Performed by: PSYCHIATRY & NEUROLOGY

## 2023-04-05 NOTE — PROGRESS NOTES
NEUROPSYCHOLOGY FEEDBACK (TELEHEALTH)    Referral Information  Name: Palmer Guerra  MRN: [id number]  : 1965  Age: 57 y.o.  Race: White  Gender: male  Referring Provider: Ruthann Mejia NP  Billing: See below for details as coding/billing has changed   Telemedicine:   The patient location is: [place], [place]  The provider location is: [place], [place]  The chief complaint leading to consultation/medical necessity is: Feedback from neuropsychological evaluation.  Visit type: Virtual visit with synchronous audio and video  Total time spent with patient: 54 minutes; [id number] attached to testing visit on 3/29/2023  Each patient to whom he or she provides medical services by telemedicine is:  (1) informed of the relationship between the physician and patient and the respective role of any other health care provider with respect to management of the patient; and (2) notified that he or she may decline to receive medical services by telemedicine and may withdraw from such care at any time.  Consent/Emergency Plan: The patient expressed an understanding of the purpose of the evaluation and consented to all procedures. I informed the patient of limits to confidentiality and discussed an emergency plan. His wife was present during the visit.    NOTE   Palmer Guerra attended a feedback session today.  We discussed the results of the neuropsychological evaluation.  I provided Mr. Guerra with a copy of the evaluation report and gave time to discuss questions and concerns.    Ally Ch, Ph.D., ABPP  Board Certified in Clinical Neuropsychology  [facility] - Department of Neurology

## 2023-04-06 ENCOUNTER — PATIENT MESSAGE (OUTPATIENT)
Dept: NEUROLOGY | Facility: CLINIC | Age: 58
End: 2023-04-06
Payer: MEDICAID

## 2023-04-10 ENCOUNTER — PATIENT MESSAGE (OUTPATIENT)
Dept: PSYCHIATRY | Facility: CLINIC | Age: 58
End: 2023-04-10
Payer: MEDICAID

## 2023-04-16 ENCOUNTER — PATIENT MESSAGE (OUTPATIENT)
Dept: NEUROLOGY | Facility: CLINIC | Age: 58
End: 2023-04-16
Payer: MEDICAID

## 2023-04-21 ENCOUNTER — TELEPHONE (OUTPATIENT)
Dept: NEUROLOGY | Facility: CLINIC | Age: 58
End: 2023-04-21
Payer: MEDICAID

## 2023-04-21 ENCOUNTER — LAB VISIT (OUTPATIENT)
Dept: LAB | Facility: HOSPITAL | Age: 58
End: 2023-04-21
Attending: NURSE PRACTITIONER
Payer: MEDICAID

## 2023-04-21 ENCOUNTER — OFFICE VISIT (OUTPATIENT)
Dept: NEUROLOGY | Facility: CLINIC | Age: 58
End: 2023-04-21
Payer: MEDICAID

## 2023-04-21 VITALS
SYSTOLIC BLOOD PRESSURE: 119 MMHG | WEIGHT: 188.69 LBS | HEART RATE: 76 BPM | HEIGHT: 69 IN | RESPIRATION RATE: 17 BRPM | BODY MASS INDEX: 27.95 KG/M2 | DIASTOLIC BLOOD PRESSURE: 68 MMHG

## 2023-04-21 DIAGNOSIS — R26.89 IMBALANCE: ICD-10-CM

## 2023-04-21 DIAGNOSIS — Z86.73 HISTORY OF STROKE: ICD-10-CM

## 2023-04-21 DIAGNOSIS — F10.11 ALCOHOL ABUSE, IN REMISSION: ICD-10-CM

## 2023-04-21 DIAGNOSIS — Z72.0 TOBACCO ABUSE: ICD-10-CM

## 2023-04-21 DIAGNOSIS — F03.90 DEMENTIA WITHOUT BEHAVIORAL DISTURBANCE: ICD-10-CM

## 2023-04-21 DIAGNOSIS — R41.3 MEMORY LOSS: ICD-10-CM

## 2023-04-21 DIAGNOSIS — R41.3 MEMORY LOSS: Primary | ICD-10-CM

## 2023-04-21 DIAGNOSIS — F03.90 MAJOR NEUROCOGNITIVE DISORDER: ICD-10-CM

## 2023-04-21 DIAGNOSIS — E53.8 B12 DEFICIENCY: ICD-10-CM

## 2023-04-21 DIAGNOSIS — F41.9 ANXIETY: ICD-10-CM

## 2023-04-21 DIAGNOSIS — I69.334 MONOPLEGIA OF UPPER LIMB FOLLOWING CEREBRAL INFARCTION AFFECTING LEFT NON-DOMINANT SIDE: ICD-10-CM

## 2023-04-21 LAB
25(OH)D3+25(OH)D2 SERPL-MCNC: 45 NG/ML (ref 30–96)
FOLATE SERPL-MCNC: 16.4 NG/ML (ref 4–24)
VIT B12 SERPL-MCNC: 380 PG/ML (ref 210–950)

## 2023-04-21 PROCEDURE — 84425 ASSAY OF VITAMIN B-1: CPT | Performed by: NURSE PRACTITIONER

## 2023-04-21 PROCEDURE — 3078F DIAST BP <80 MM HG: CPT | Mod: CPTII,,, | Performed by: NURSE PRACTITIONER

## 2023-04-21 PROCEDURE — 82746 ASSAY OF FOLIC ACID SERUM: CPT | Performed by: NURSE PRACTITIONER

## 2023-04-21 PROCEDURE — 99999 PR PBB SHADOW E&M-EST. PATIENT-LVL IV: ICD-10-PCS | Mod: PBBFAC,,, | Performed by: NURSE PRACTITIONER

## 2023-04-21 PROCEDURE — 3074F PR MOST RECENT SYSTOLIC BLOOD PRESSURE < 130 MM HG: ICD-10-PCS | Mod: CPTII,,, | Performed by: NURSE PRACTITIONER

## 2023-04-21 PROCEDURE — 99214 OFFICE O/P EST MOD 30 MIN: CPT | Mod: PBBFAC,PO | Performed by: NURSE PRACTITIONER

## 2023-04-21 PROCEDURE — 3078F PR MOST RECENT DIASTOLIC BLOOD PRESSURE < 80 MM HG: ICD-10-PCS | Mod: CPTII,,, | Performed by: NURSE PRACTITIONER

## 2023-04-21 PROCEDURE — 99215 OFFICE O/P EST HI 40 MIN: CPT | Mod: S$PBB,,, | Performed by: NURSE PRACTITIONER

## 2023-04-21 PROCEDURE — 99999 PR PBB SHADOW E&M-EST. PATIENT-LVL IV: CPT | Mod: PBBFAC,,, | Performed by: NURSE PRACTITIONER

## 2023-04-21 PROCEDURE — 83921 ORGANIC ACID SINGLE QUANT: CPT | Performed by: NURSE PRACTITIONER

## 2023-04-21 PROCEDURE — 82306 VITAMIN D 25 HYDROXY: CPT | Performed by: NURSE PRACTITIONER

## 2023-04-21 PROCEDURE — 3008F PR BODY MASS INDEX (BMI) DOCUMENTED: ICD-10-PCS | Mod: CPTII,,, | Performed by: NURSE PRACTITIONER

## 2023-04-21 PROCEDURE — 36415 COLL VENOUS BLD VENIPUNCTURE: CPT | Mod: PO | Performed by: NURSE PRACTITIONER

## 2023-04-21 PROCEDURE — 84207 ASSAY OF VITAMIN B-6: CPT | Performed by: NURSE PRACTITIONER

## 2023-04-21 PROCEDURE — 82607 VITAMIN B-12: CPT | Performed by: NURSE PRACTITIONER

## 2023-04-21 PROCEDURE — 99215 PR OFFICE/OUTPT VISIT, EST, LEVL V, 40-54 MIN: ICD-10-PCS | Mod: S$PBB,,, | Performed by: NURSE PRACTITIONER

## 2023-04-21 PROCEDURE — 3008F BODY MASS INDEX DOCD: CPT | Mod: CPTII,,, | Performed by: NURSE PRACTITIONER

## 2023-04-21 PROCEDURE — 1159F PR MEDICATION LIST DOCUMENTED IN MEDICAL RECORD: ICD-10-PCS | Mod: CPTII,,, | Performed by: NURSE PRACTITIONER

## 2023-04-21 PROCEDURE — 3074F SYST BP LT 130 MM HG: CPT | Mod: CPTII,,, | Performed by: NURSE PRACTITIONER

## 2023-04-21 PROCEDURE — 1159F MED LIST DOCD IN RCRD: CPT | Mod: CPTII,,, | Performed by: NURSE PRACTITIONER

## 2023-04-21 RX ORDER — SERTRALINE HYDROCHLORIDE 100 MG/1
150 TABLET, FILM COATED ORAL DAILY
Qty: 135 TABLET | Refills: 3 | Status: SHIPPED | OUTPATIENT
Start: 2023-04-21 | End: 2023-11-13

## 2023-04-21 RX ORDER — TRAZODONE HYDROCHLORIDE 50 MG/1
50 TABLET ORAL NIGHTLY
Qty: 30 TABLET | Refills: 11 | Status: SHIPPED | OUTPATIENT
Start: 2023-04-21 | End: 2024-04-20

## 2023-04-21 NOTE — PATIENT INSTRUCTIONS
Increase sertraline to 150 mg    Continue the same Buspar three times per day    Wean off doxepin -- can have cognitive side effects.   Take 1 capsule every other night for 1 week, then stop it.     Try trazodone for sleep, take 50 mg at bedtime to start.     Referral placed to Dr. Johnston, the cognitive specialist that we discussed who practices at main [facility] on [facility]. Will likely take several months to get you in with him.     On waiting list for psychiatry -- please take this appointment when it comes available!     Will try to get the PET scan of the brain as discussed.     Let me know which therapy facility to send orders for PT and OT to that will take your insurance near home
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
## 2023-04-25 LAB
METHYLMALONATE SERPL-SCNC: 0.42 UMOL/L
PYRIDOXAL SERPL-MCNC: 31 UG/L (ref 5–50)
VIT B1 BLD-MCNC: 96 UG/L (ref 38–122)

## 2023-04-28 ENCOUNTER — HOSPITAL ENCOUNTER (OUTPATIENT)
Dept: RADIOLOGY | Facility: HOSPITAL | Age: 58
Discharge: HOME | End: 2023-04-28
Attending: NURSE PRACTITIONER
Payer: MEDICAID

## 2023-04-28 DIAGNOSIS — F03.90 DEMENTIA WITHOUT BEHAVIORAL DISTURBANCE: ICD-10-CM

## 2023-04-28 PROCEDURE — 78608 BRAIN IMAGING (PET): CPT | Mod: 26,PS,, | Performed by: RADIOLOGY

## 2023-04-28 PROCEDURE — A9552 F18 FDG: HCPCS | Mod: PN

## 2023-04-28 PROCEDURE — 78608 NM PET BRAIN: ICD-10-PCS | Mod: 26,PS,, | Performed by: RADIOLOGY

## 2023-04-28 NOTE — PROGRESS NOTES
PET Imaging Questionnaire    Are you a Diabetic? Recent Blood Sugar level? No    Are you anemic? Bone Marrow Stimulation Meds? No    Have you had a CT Scan, if so when & where was your last one? Yes -     Have you had a PET Scan, if so when & where was your last one? No    Chemotherapy or currently on Chemotherapy? No    Radiation therapy? No    Surgical History:   Past Surgical History:   Procedure Laterality Date    TONSILLECTOMY          Have you been fasting for at least 6 hours? Yes    Is there any chance you may be pregnant or breastfeeding? No    Assay: 9.83 MCi@:12:57   Injection Site:RT AC    Residual: .773 mCi@: 12:59   Technologist: Gamaliel San Injected:9.06mCi

## 2023-05-01 PROBLEM — Z86.73 HISTORY OF STROKE: Status: ACTIVE | Noted: 2023-05-01

## 2023-05-01 NOTE — ASSESSMENT & PLAN NOTE
ASA 81 mg, statin   Last LDL at goal  Reinforced need for smoking cessation  BP at goal   Polycythemia resolved   30 day cardiac monitor neg

## 2023-05-01 NOTE — ASSESSMENT & PLAN NOTE
Reviewed repeat NP testing, also discussed with Dr. Ch  Performance and symptoms out of proportion to be explained by stroke and other metabolic issues over time  Note is made of persistent and significant anxiety that is not adequately controlled, but unclear how much of this can explain his degree of dysfunction  On wait list for psych and also looking to establish with new PCP -- sertraline increased today, continue Buspar  D/c doxepin, try trazodone for sleep   Can consider adding donepezil, avoid multiple Rx changes at once     Discussed more advanced testing, like LP for CSF biomarkers, more advanced imaging, referral to cognitive subspeciality --- agreeable to see Dr. Johnston for input. Will defer need for LP to him. Pt and wife would like to see if insurance would cover brain PET.

## 2023-05-09 ENCOUNTER — TELEPHONE (OUTPATIENT)
Dept: PSYCHIATRY | Facility: CLINIC | Age: 58
End: 2023-05-09
Payer: MEDICAID

## 2023-05-09 NOTE — TELEPHONE ENCOUNTER
Called patient about referral for medication management   Wife answered. She said They are in the middle of [place] and [place] so either location works for them.   Wife asked that we add him to our wait list    Called r[facility]us to check if Marilynn Nobles was needed for added colon:     [facility]) states no outpatient procedures require authorization. She states there is no technical reference number but we can use her name and today's date.  Angy6/12/20

## 2023-05-19 ENCOUNTER — TELEPHONE (OUTPATIENT)
Dept: NEUROLOGY | Facility: CLINIC | Age: 58
End: 2023-05-19
Payer: MEDICAID

## 2023-05-19 NOTE — TELEPHONE ENCOUNTER
Received physical therapy initial eval from [facility] for pt.  needs updating in their system.

## 2023-06-26 ENCOUNTER — TELEPHONE (OUTPATIENT)
Dept: NEUROLOGY | Facility: CLINIC | Age: 58
End: 2023-06-26
Payer: MEDICAID

## 2023-08-07 ENCOUNTER — OUTPATIENT CASE MANAGEMENT (OUTPATIENT)
Dept: NEUROLOGY | Facility: CLINIC | Age: 58
End: 2023-08-07
Payer: MEDICAID

## 2023-08-07 DIAGNOSIS — R46.89 COGNITIVE AND BEHAVIORAL CHANGES: Primary | ICD-10-CM

## 2023-08-07 DIAGNOSIS — R41.89 COGNITIVE AND BEHAVIORAL CHANGES: Primary | ICD-10-CM

## 2023-08-07 PROCEDURE — 99358 PROLONG SERVICE W/O CONTACT: CPT | Mod: S$PBB,,, | Performed by: PSYCHIATRY & NEUROLOGY

## 2023-08-07 PROCEDURE — 99358 PR PROLONGED SERV,NO CONTACT,1ST HR: ICD-10-PCS | Mod: S$PBB,,, | Performed by: PSYCHIATRY & NEUROLOGY

## 2023-08-07 NOTE — PROGRESS NOTES
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 subcortical white matter changes with cystic cavitation predominantly in the right hemisphere with occasional hyperintensity in the right cystic cavitation of the external watershed territories in the right hemisphere with atypical right putamen hyperintensity. Scattered subcortical microvascular changes in the anterior temporal structures.    Brain Fluorodeoxyglucose-positron emission tomography on 4/28/2023  Formal interpretation by Radiology:  1. No areas of abnormal activity to suggest a progressive neurodegenerative process. 2. Chronic right MCA distribution infarcts with expected decreased metabolic activity within the infarcted regions.  Independently reviewed radiological imaging by Alvin Soria MD. MPH. Behavioral Neurologist  Impression: : There is corresponding decreased metabolic activity within the infarcted regions. Otherwise, there is symmetric metabolic activity within the cerebral and cerebellar hemispheres with no other significant focal or diffuse areas of abnormal activity.     Working Diagnosis/Differential  VAD     Sincerely,  Alvin Johnston MD. MPH.    [facility]  [facility]

## 2023-08-09 ENCOUNTER — OFFICE VISIT (OUTPATIENT)
Dept: NEUROLOGY | Facility: CLINIC | Age: 58
End: 2023-08-09
Payer: MEDICAID

## 2023-08-09 ENCOUNTER — LAB VISIT (OUTPATIENT)
Dept: LAB | Facility: HOSPITAL | Age: 58
End: 2023-08-09
Attending: PSYCHIATRY & NEUROLOGY
Payer: MEDICAID

## 2023-08-09 ENCOUNTER — TELEPHONE (OUTPATIENT)
Dept: NEUROLOGY | Facility: CLINIC | Age: 58
End: 2023-08-09
Payer: MEDICAID

## 2023-08-09 VITALS
HEIGHT: 69 IN | SYSTOLIC BLOOD PRESSURE: 120 MMHG | BODY MASS INDEX: 27.11 KG/M2 | WEIGHT: 183 LBS | HEART RATE: 62 BPM | DIASTOLIC BLOOD PRESSURE: 71 MMHG

## 2023-08-09 DIAGNOSIS — F03.90 MAJOR NEUROCOGNITIVE DISORDER: ICD-10-CM

## 2023-08-09 DIAGNOSIS — R68.89 SPELLS OF DECREASED ATTENTIVENESS: ICD-10-CM

## 2023-08-09 DIAGNOSIS — R44.3 HALLUCINATION: ICD-10-CM

## 2023-08-09 DIAGNOSIS — F01.A18 MILD VASCULAR DEMENTIA WITH OTHER BEHAVIORAL DISTURBANCE: Primary | ICD-10-CM

## 2023-08-09 DIAGNOSIS — G20.C PARKINSONISM, UNSPECIFIED PARKINSONISM TYPE: ICD-10-CM

## 2023-08-09 DIAGNOSIS — Z79.899 POLYPHARMACY: ICD-10-CM

## 2023-08-09 DIAGNOSIS — G47.00 INSOMNIA, UNSPECIFIED TYPE: ICD-10-CM

## 2023-08-09 DIAGNOSIS — G47.33 OSA (OBSTRUCTIVE SLEEP APNEA): ICD-10-CM

## 2023-08-09 LAB
ALBUMIN SERPL BCP-MCNC: 4.5 G/DL (ref 3.5–5.2)
ALP SERPL-CCNC: 87 U/L (ref 55–135)
ALT SERPL W/O P-5'-P-CCNC: 20 U/L (ref 10–44)
ANION GAP SERPL CALC-SCNC: 10 MMOL/L (ref 8–16)
AST SERPL-CCNC: 22 U/L (ref 10–40)
BASOPHILS # BLD AUTO: 0.07 K/UL (ref 0–0.2)
BASOPHILS NFR BLD: 0.7 % (ref 0–1.9)
BILIRUB SERPL-MCNC: 0.4 MG/DL (ref 0.1–1)
BUN SERPL-MCNC: 4 MG/DL (ref 6–20)
CALCIUM SERPL-MCNC: 9.8 MG/DL (ref 8.7–10.5)
CHLORIDE SERPL-SCNC: 102 MMOL/L (ref 95–110)
CO2 SERPL-SCNC: 27 MMOL/L (ref 23–29)
CREAT SERPL-MCNC: 1 MG/DL (ref 0.5–1.4)
DIFFERENTIAL METHOD: ABNORMAL
EOSINOPHIL # BLD AUTO: 0.2 K/UL (ref 0–0.5)
EOSINOPHIL NFR BLD: 2.1 % (ref 0–8)
ERYTHROCYTE [DISTWIDTH] IN BLOOD BY AUTOMATED COUNT: 14.6 % (ref 11.5–14.5)
EST. GFR  (NO RACE VARIABLE): >60 ML/MIN/1.73 M^2
FOLATE SERPL-MCNC: 16.2 NG/ML (ref 4–24)
GLUCOSE SERPL-MCNC: 94 MG/DL (ref 70–110)
HCT VFR BLD AUTO: 40.9 % (ref 40–54)
HGB BLD-MCNC: 13.3 G/DL (ref 14–18)
IGG SERPL-MCNC: 1318 MG/DL (ref 650–1600)
IMM GRANULOCYTES # BLD AUTO: 0.02 K/UL (ref 0–0.04)
IMM GRANULOCYTES NFR BLD AUTO: 0.2 % (ref 0–0.5)
LYMPHOCYTES # BLD AUTO: 2.6 K/UL (ref 1–4.8)
LYMPHOCYTES NFR BLD: 26.8 % (ref 18–48)
MAGNESIUM SERPL-MCNC: 2.2 MG/DL (ref 1.6–2.6)
MCH RBC QN AUTO: 28.3 PG (ref 27–31)
MCHC RBC AUTO-ENTMCNC: 32.5 G/DL (ref 32–36)
MCV RBC AUTO: 87 FL (ref 82–98)
MONOCYTES # BLD AUTO: 0.8 K/UL (ref 0.3–1)
MONOCYTES NFR BLD: 8 % (ref 4–15)
NEUTROPHILS # BLD AUTO: 6.1 K/UL (ref 1.8–7.7)
NEUTROPHILS NFR BLD: 62.2 % (ref 38–73)
NRBC BLD-RTO: 0 /100 WBC
PLATELET # BLD AUTO: 306 K/UL (ref 150–450)
PMV BLD AUTO: 10.4 FL (ref 9.2–12.9)
POTASSIUM SERPL-SCNC: 3.9 MMOL/L (ref 3.5–5.1)
PROT SERPL-MCNC: 8.3 G/DL (ref 6–8.4)
RBC # BLD AUTO: 4.7 M/UL (ref 4.6–6.2)
SODIUM SERPL-SCNC: 139 MMOL/L (ref 136–145)
T4 SERPL-MCNC: 5.9 UG/DL (ref 4.5–11.5)
TSH SERPL DL<=0.005 MIU/L-ACNC: 1.06 UIU/ML (ref 0.4–4)
WBC # BLD AUTO: 9.85 K/UL (ref 3.9–12.7)

## 2023-08-09 PROCEDURE — 84443 ASSAY THYROID STIM HORMONE: CPT | Performed by: PSYCHIATRY & NEUROLOGY

## 2023-08-09 PROCEDURE — 30000890 PTAU-181, PLASMA: Performed by: PSYCHIATRY & NEUROLOGY

## 2023-08-09 PROCEDURE — 99215 OFFICE O/P EST HI 40 MIN: CPT | Mod: S$PBB,,, | Performed by: PSYCHIATRY & NEUROLOGY

## 2023-08-09 PROCEDURE — 99417 PR PROLONGED SVC, OUTPT, W/WO DIRECT PT CONTACT,  EA ADDTL 15 MIN: ICD-10-PCS | Mod: S$PBB,,, | Performed by: PSYCHIATRY & NEUROLOGY

## 2023-08-09 PROCEDURE — 84436 ASSAY OF TOTAL THYROXINE: CPT | Performed by: PSYCHIATRY & NEUROLOGY

## 2023-08-09 PROCEDURE — 99215 PR OFFICE/OUTPT VISIT, EST, LEVL V, 40-54 MIN: ICD-10-PCS | Mod: S$PBB,,, | Performed by: PSYCHIATRY & NEUROLOGY

## 2023-08-09 PROCEDURE — 82784 ASSAY IGA/IGD/IGG/IGM EACH: CPT | Performed by: PSYCHIATRY & NEUROLOGY

## 2023-08-09 PROCEDURE — 1160F RVW MEDS BY RX/DR IN RCRD: CPT | Mod: CPTII,,, | Performed by: PSYCHIATRY & NEUROLOGY

## 2023-08-09 PROCEDURE — 3078F PR MOST RECENT DIASTOLIC BLOOD PRESSURE < 80 MM HG: ICD-10-PCS | Mod: CPTII,,, | Performed by: PSYCHIATRY & NEUROLOGY

## 2023-08-09 PROCEDURE — 99417 PROLNG OP E/M EACH 15 MIN: CPT | Mod: S$PBB,,, | Performed by: PSYCHIATRY & NEUROLOGY

## 2023-08-09 PROCEDURE — 86780 TREPONEMA PALLIDUM: CPT | Performed by: PSYCHIATRY & NEUROLOGY

## 2023-08-09 PROCEDURE — 3078F DIAST BP <80 MM HG: CPT | Mod: CPTII,,, | Performed by: PSYCHIATRY & NEUROLOGY

## 2023-08-09 PROCEDURE — 80053 COMPREHEN METABOLIC PANEL: CPT | Performed by: PSYCHIATRY & NEUROLOGY

## 2023-08-09 PROCEDURE — 3008F BODY MASS INDEX DOCD: CPT | Mod: CPTII,,, | Performed by: PSYCHIATRY & NEUROLOGY

## 2023-08-09 PROCEDURE — 82746 ASSAY OF FOLIC ACID SERUM: CPT | Performed by: PSYCHIATRY & NEUROLOGY

## 2023-08-09 PROCEDURE — 83921 ORGANIC ACID SINGLE QUANT: CPT | Performed by: PSYCHIATRY & NEUROLOGY

## 2023-08-09 PROCEDURE — 99999 PR PBB SHADOW E&M-EST. PATIENT-LVL IV: CPT | Mod: PBBFAC,,, | Performed by: PSYCHIATRY & NEUROLOGY

## 2023-08-09 PROCEDURE — 85025 COMPLETE CBC W/AUTO DIFF WBC: CPT | Performed by: PSYCHIATRY & NEUROLOGY

## 2023-08-09 PROCEDURE — 96116 NUBHVL XM PHYS/QHP 1ST HR: CPT | Mod: 59,PBBFAC | Performed by: PSYCHIATRY & NEUROLOGY

## 2023-08-09 PROCEDURE — 30000890 MISCELLANEOUS SENDOUT TEST, BLOOD: Performed by: PSYCHIATRY & NEUROLOGY

## 2023-08-09 PROCEDURE — 96116 PR NEUROBEHAVIORAL STATUS EXAM BY PSYCH/PHYS: ICD-10-PCS | Mod: S$PBB,59,, | Performed by: PSYCHIATRY & NEUROLOGY

## 2023-08-09 PROCEDURE — 1159F PR MEDICATION LIST DOCUMENTED IN MEDICAL RECORD: ICD-10-PCS | Mod: CPTII,,, | Performed by: PSYCHIATRY & NEUROLOGY

## 2023-08-09 PROCEDURE — 83520 IMMUNOASSAY QUANT NOS NONAB: CPT | Performed by: PSYCHIATRY & NEUROLOGY

## 2023-08-09 PROCEDURE — 0346U MISCELLANEOUS SENDOUT TEST, BLOOD: CPT | Performed by: PSYCHIATRY & NEUROLOGY

## 2023-08-09 PROCEDURE — 99999 PR PBB SHADOW E&M-EST. PATIENT-LVL IV: ICD-10-PCS | Mod: PBBFAC,,, | Performed by: PSYCHIATRY & NEUROLOGY

## 2023-08-09 PROCEDURE — 99214 OFFICE O/P EST MOD 30 MIN: CPT | Mod: PBBFAC | Performed by: PSYCHIATRY & NEUROLOGY

## 2023-08-09 PROCEDURE — 3008F PR BODY MASS INDEX (BMI) DOCUMENTED: ICD-10-PCS | Mod: CPTII,,, | Performed by: PSYCHIATRY & NEUROLOGY

## 2023-08-09 PROCEDURE — 1160F PR REVIEW ALL MEDS BY PRESCRIBER/CLIN PHARMACIST DOCUMENTED: ICD-10-PCS | Mod: CPTII,,, | Performed by: PSYCHIATRY & NEUROLOGY

## 2023-08-09 PROCEDURE — 96116 NUBHVL XM PHYS/QHP 1ST HR: CPT | Mod: S$PBB,59,, | Performed by: PSYCHIATRY & NEUROLOGY

## 2023-08-09 PROCEDURE — 82607 VITAMIN B-12: CPT | Performed by: PSYCHIATRY & NEUROLOGY

## 2023-08-09 PROCEDURE — 3074F PR MOST RECENT SYSTOLIC BLOOD PRESSURE < 130 MM HG: ICD-10-PCS | Mod: CPTII,,, | Performed by: PSYCHIATRY & NEUROLOGY

## 2023-08-09 PROCEDURE — 1159F MED LIST DOCD IN RCRD: CPT | Mod: CPTII,,, | Performed by: PSYCHIATRY & NEUROLOGY

## 2023-08-09 PROCEDURE — 3074F SYST BP LT 130 MM HG: CPT | Mod: CPTII,,, | Performed by: PSYCHIATRY & NEUROLOGY

## 2023-08-09 PROCEDURE — 83735 ASSAY OF MAGNESIUM: CPT | Performed by: PSYCHIATRY & NEUROLOGY

## 2023-08-09 PROCEDURE — 0361U NEURFLMNT LT CHN DIG IA QUAN: CPT | Performed by: PSYCHIATRY & NEUROLOGY

## 2023-08-09 PROCEDURE — 84425 ASSAY OF VITAMIN B-1: CPT | Performed by: PSYCHIATRY & NEUROLOGY

## 2023-08-09 NOTE — TELEPHONE ENCOUNTER
Pt was a No Show to his appt with Dr Johnston today. If pt desires Neuro appt, he is to be offered resident clinic for VAD

## 2023-08-09 NOTE — PROGRESS NOTES
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 depression. He has been reliant on his family since his strokes in early 2021. These symptoms collectively suggest a diagnosis of vascular dementia, potentially dating back to 2021. Notably, there was a decline in his condition over the past six months. It's uncertain whether this decline is due to alcohol withdrawal, the increased Zoloft dose, or potential subclinical seizures. 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 It remains uncertain whether this is vascular parkinsonism or an early sign of Lewy body disease. For future steps, I recommend an MRI brain scan, serum blood work, and a skin biopsy to further clarify the disease process. It might also be beneficial to switch from BuSpar to Lexapro and to reduce the sertraline dosage to the previously tolerated 50 mg.  Unresolved Concern(s) reported by patient/family:  MDD refractory  Questionable seizure activity  Insomnia w/ NERISSA untreated     Review of cognitive, visuospatial, motor, sensory, and behavioral systems:     Memory:   The patient's memory has worsened in the past few years.  He does repeat statements or asks the same question repeatedly.  He does have  difficulty remembering recent important conversations.  He does have difficulty remembering recent events.  He does forget information within minutes.  His recent retrograde memory is intact.  His remote memory is intact.  Attention:   The patient's attention and concentration are impaired.  He does have attentional fluctuations.  He does have difficulty with selective attention.  He does become easily distracted.  He does have difficulty with divided attention.  Executive:   The patient's cognitive processing speed is slower.  He does have difficulty with working memory.  He does misplace personal items (e.g., keys, cell phone, wallet) more frequently.  He does have difficulty keeping track of his medications.  He does have difficulty with planning/organizing/completing multistep tasks.  He does have difficulty with executive attention.  He does not have difficulty with flexible thinking.  He does not difficulty with self control.  He denies new impulsivity or rash/careless actions.  His judgment is intact.  Language:   The patient's speech is affected.  He does not forget people's names more frequently.  He does have word-finding difficulties.  His speech is fluent and non-effortful.  His speech is grammatically intact.  He does not make word substitutions.  He does not have difficulty reading.  He does not appear to have impaired comprehension.  Visuospatial:   The patient has new visuospatial problems.  He has become confused or disoriented in *new*, unfamiliar places.  He does have trouble navigating.  He does not get lost in familiar places.  He does not have visuospatial disorientation.  He does not have difficulty recognizing objects or faces.  He denies problems with driving or parking.  Motor/Coordination:   The patient does have difficulty with walking.  He does feel imbalanced.  He denies having fallen.  He reports new muscle weakness.  He does have difficulty buttoning shirts, operating zippers, or  manipulating tools/utensils.  His handwriting has not become micrographic.  He does not have a resting tremor.  He denies having any new involuntary movements and/or muscle jerking.  He does not have swallowing difficulty.  He denies new muscle cramps and twitching.  Sensory:   The patient reports a new sensory disturbance described as numbness, tingling, paresthesias, and/or pain.  The patient denies a loss of vision, blurry vision, or double vision.  The patient denies new loss of hearing or worsening tinnitus.  The patient denies anosmia.  Sleep:   The patient reports difficulty sleeping.  The patient does not have difficulty going to sleep.  The patient reports difficulty staying asleep and/or frequently awakening at night.  The patient does snore and/or have witnessed apneas while sleeping.  When he wakes up in the morning, he does not feel well-rested.  He has been reported to have dream-enactment behavior. Comment: >20 yrs  He denies symptoms suggestive of restless leg syndrome.  Behavior:   The patient's personality has changed.  He does not have symptoms of disinhibition and social inappropriateness.  He does not have symptoms to suggest a loss of manners or decorum.  He does not have apathy and/or decreased motivation.  He does appear to have had a change in behavioral/emotional inertia.  The patient's emotional expression has changed.  He does have emotional blunting or lability.  He does have symptoms of irritability and mood lability.  He has been reported to have new symptoms of agitation, aggression, or violent outbursts.  His insight into his disease and situation is impaired.  His personal hygiene is intact.  He is not exhibiting a diminished response to other people's needs and feelings.  He is not exhibiting a diminished social interest, interrelatedness, or personal warmth.  He denies restlessness.  He denies new and/or worsening simple repetitive behaviors.  His speech has not become simplified  or become repetitive/stereotyped.  He denies new/worsening complex repetitive/ritualistic compulsions and behaviors.  He does not have symptoms of hyper-religiosity or dogmatism.  His interests/pleasures have not become restrictive, simplified, interrupting, or repetitive.  He denies a change of self-stimulating behavior.  He denies any changes in eating behavior.  He denies increased consumption of food or substances.  He denies oral exploration or consumption of inedible objects.  Psychiatric:   He does feel depressed.  He is exhibiting symptoms of social withdrawal/indifference.  He denies anxiety.  He does exhibit cycling behavior.  He does exhibit hyperactive behavior.  He is not exhibited symptoms of paranoia.  He does not have delusions.  He does not have hallucinations.  He does not have a history of sensitivity to neuroleptic/psychotropic medications.  Medical Review of Systems:   The patient does not have constipation.  The patient does not have urinary incontinence.  The patient reports symptoms that are suggestive of orthostatic lightheadedness.  The patient's weight is stable.  Functional status:  Difficulty performing the following Instrumental ADLs:  Housekeeping: No  Food Preparation: Yes  Shopping: Yes  Ability to Handle Finances: Yes  Transportation/Driving: No  Household Appliances/Stove: No  Laundry: No  Difficulty performing the following Basic ADLs:  Dressing: No  Bathing: No  Toileting: No  Personal hygiene and grooming: No  Feeding: No  Care Management:  Patient/Family Safety Concerns:  Medication Adherence: No  Home Safety: No  Wandered: No  Firearms: No  Fall Risk: No  Home Alone: No          Past Medical History:   Diagnosis Date    CVA (cerebral vascular accident) 2/2/2021    Depression     ED (erectile dysfunction)     Hypertension     Stroke     2/2/20       Past Surgical History:   Procedure Laterality Date    TONSILLECTOMY         Family History   Problem Relation Age of Onset     Hypertension Mother     Macular degeneration Mother     Hypertension Maternal Grandmother     Hearing loss Maternal Grandmother     Cataracts Maternal Grandmother     Glaucoma Neg Hx     Retinal detachment Neg Hx        Social History     Socioeconomic History    Marital status:    Tobacco Use    Smoking status: Every Day     Current packs/day: 1.00     Average packs/day: 1 pack/day for 42.0 years (42.0 ttl pk-yrs)     Types: Cigarettes    Smokeless tobacco: Never   Substance and Sexual Activity    Alcohol use: Not Currently     Comment: 12-20 cans/day beer    Drug use: No       Medication:     Current Outpatient Medications on File Prior to Visit   Medication Sig Dispense Refill    amLODIPine (NORVASC) 10 MG tablet Take 1 tablet (10 mg total) by mouth once daily. 30 tablet 11    aspirin (ECOTRIN) 81 MG EC tablet Take 1 tablet (81 mg total) by mouth once daily. 30 tablet 3    atorvastatin (LIPITOR) 40 MG tablet TAKE 1 TABLET BY MOUTH ONCE DAILY IN THE EVENING 90 tablet 1    busPIRone (BUSPAR) 15 MG tablet Take 1 tablet by mouth three times daily as needed 40 tablet 0    cetirizine (ZYRTEC) 10 MG tablet Take 10 mg by mouth once daily.      multivit-min-folic-vit K-lycop (ONE-A-DAY MEN'S 50 PLUS) 400- mcg Tab Take 1 tablet by mouth once daily.      sertraline (ZOLOFT) 100 MG tablet Take 1.5 tablets (150 mg total) by mouth once daily. 135 tablet 3    sildenafiL (VIAGRA) 100 MG tablet Take 100 mg by mouth daily as needed for Erectile Dysfunction.      traZODone (DESYREL) 50 MG tablet Take 1 tablet (50 mg total) by mouth every evening. 30 tablet 11     Current Facility-Administered Medications on File Prior to Visit   Medication Dose Route Frequency Provider Last Rate Last Admin    neomycin-bacitracin-polymyxin packet    PRN Erich Larson MD   1 packet at 04/10/14 9845        Review of patient's allergies indicates:  No Known Allergies    Medications Reconciliation:   I have reconciled the patient's  home medications and discharge medications with the patient/family. I have updated all changes.  Refer to After-Visit Medication List.    Objective:  Vital Signs:  Vitals:    08/09/23 1405   BP: 120/71   Pulse: 62     Wt Readings from Last 3 Encounters:   08/09/23 1405 83 kg (183 lb 0.1 oz)   04/21/23 1122 85.6 kg (188 lb 11.4 oz)   11/09/22 1547 84.7 kg (186 lb 11.7 oz)     Body mass index is 27.02 kg/m².           Neurological examination:  Mental Status:   His appearance deviates from typical expectations given their age and context. Comment: looks older than stated age, informal grooming;  Throughout the interview, he behaved abnormally and was not cooperative.  His behavior is appropriate to the clinical context without impropriety or improper language/conduct.  His behavior was not characterized by episodes of sudden uncontrollable and inappropriate laughing or crying.  The patient's energy level is abnormal. Comment: Hypoactive;  His orientation is normal; Spatial 5/5 (location, the floor of building, city, county, state) and temporal 5/5 (month, day, year, ANT) dimensions are accurate.  His attention/concentration is impaired.  He can complete three-step commands.  His fund of knowledge was appropriate for age, culture, and level of education.  His thought process is not logical or goal-oriented. Comment: circumstantial, bradyphrenia;  He demonstrated impaired insight based on actions, awareness of his illness, plans for the future.  He demonstrated good judgment based on actions and plans for the future.  He has no evidence of hallucinations (auditory, visual, olfactory).  He has no evidence of delusions (paranoid, grandiose, bizarre).  Cranial Nerves:   His pupils were normal.  His visual fields were full to confrontation in all quadrants.  His ocular pursuit in the horizontal and vertical plane was complete.  His saccadic initiation, velocity, and amplitude are normal.  His eyelid assessment showed no  apraxia. There was no eyelid dysfunction, retraction, or og sign.  His facial strength was normal.  His facial expression was symmetric and appropriate to the context.  His hearing was diminished.  His oropharynx and soft palate appeared abnormal. Comment: mallampati 4;  His tongue showed no evidence of scalloping.  He tongue movement with normal.  He had no significant evidence of anterocollis or retrocollis.  Speech/Language:   The patient's speech was fluent, non-effortful, and his rate was appropriate to the context.  He has no articulation (segmental features) errors.  The patient's speech is not dysarthric.  The patient showed evidence of anomia.  He makes no phonological loop errors.  He can comprehend commands that cross the midline (e.g., with your left thumb, touch your right ear).  He has difficulty comprehending commands that depend on syntax. Comment: 'point to the ceiling after you point to the floor'.; 'point to the ceiling after you point to the floor'.  He has difficulty comprehending syntactically complex sentences.  Motor:   The patient's bilateral upper extremity muscle bulk is appropriate.  The patient's upper extremity muscle tone is increased.  The patient's bilateral upper extremity muscle tone does not suggest spasticity.  There is evidence of rigidity/cogwheeling. Comment: R, Mild; Muscle tone is increased and there is evidence of rigidity/cogwheeling.  There is evidence of paratonia. Comment: R, Mild; Muscle tone is increased and there is evidence of paratonia.  Assessment of motor strength was symmetric and at minimal anti-gravity.  There is no pronator or downward drift.  There is no myoclonus observed in The patient's bilateral upper and lower extremities.  There are no fasciculations observed in The patient's bilateral upper and lower extremities.  Coordination:   He has no bilateral upper extremity limb dysmetria or past pointing on finger-nose-finger bilaterally.  He has no limb  dysdiadochokinesia of the upper extremity on the pronation/supination test and screwing in a light bulb or lower extremity during tapping ball of each foot bilaterally.  He has no visible tremor.  He has no evidence of interhemispheric motor control deficits.  He has no motor overflow bilaterally.  The patient's upper extremity fine motor coordination was abnormal.  The patient's upper extremity fine motor coordination was not slow. Comment: B/L, Mod; finger tapping, pronation/supination, and the open-close fist was slow.  The patient's upper extremity fine motor coordination was not hypometric. Comment: L, Mild; finger tapping, pronation/supination, and the open-close fist showed hypometria.  The patient's upper extremity fine motor coordination was not dysrhythmic. Comment: L; finger tapping, pronation/supination, and the open-close fist showed dysrhythmia.  Higher Cortical Function:   The patient showed no evidence of simultanagnosia (Navon hierarchical letters).  The patient showed no evidence of visuospatial constructional dysfunction.  The patient showed no evidence of apraxia.  He showed no dysexecutive behavior.  Sensory:   His cortical sensory assessment demonstrated no neglect bilaterally.  His sensation was intact to light touch, and vibratory sense in the bilateral upper and lower extremities.  Reflexes:   Reflexes were symmetric and 2+ at biceps, 2+ triceps, and 2+ brachioradialis, 2+ at the knees bilaterally, there was no cross-abductor sign, 2+ in the bilateral ankles.  Gait:   He has normal posture sitting unaided.  He is unable to rise from a chair and sit back down without using their arms.  His gait was abnormal.  His posture while walking is normal.  His gait initiation/inhibition was normal.  His stance while walking is normal.  His gait speed was abnormal (70-80 F 1.13 m/s M 1.26 m/s, >80 F 0.94 m/sec, M 0.97 m/sec). Comment: slow;  His stride (gait cycle) was abnormal. Comment: decreased  step-time;  His arms swing is abnormal. Comment: B/L; asymmetric and decreased amplitude.  He takes turns in >4 steps.  He has truncal ataxia.  When attempting to walk abnormally (heels, tiptoes, tandem), he makes errors.  Neuropsychological Evaluation Summary:  Prior Neurocognitive/Neurobehavioral Evaluation(s)  No Prior Testing Available  Neurocognitive/Neurobehavioral Evaluation completed on 2023-08-09    Neuropsychiatric/Behavioral Focused Evaluation Assessment    BEHAV5+ 4/6 See ROS section for a full description   Laboratories:     Lab Date Value [Reference]   Coagulopathy Screening           Antithrombin III 2021, Mar-09    103 [83 - 118 %]      Myopathy/Myalgia   CPK 2022, Jun-05    5,689 (H) [30 - 200 U/L]      Autoimmune/Paraneoplastic Screening   CRP 2021, Mar-30    7.9 [0.0 - 8.2 mg/L]      Sed Rate 2021, Mar-30    11 [0 - 23 mm/Hr]      Infectious Disease/Immunocompromised Screening   Hgb A2 Quant 06/02/2022  2.8 [2.2 - 3.2 %]      HIV 1/2 Ag/Ab 04/21/2023  Non-reactive      Metabolic Screening   Methlymalonic Acid 03/03/2023  0.42 (H)      TSH 2023, Mar-03    1.450 [0.450 - 4.500 uIU/mL]      Albumin 2022, Jun-04    3.1 (L) [3.5 - 5.0 g/dl]      Cholesterol 2023, Mar-03    168 [100 - 199 mg/dL]      HDL 2023, Mar-03    45 [>39 mg/dL]      Triglycerides 04/21/2023  213 (H) [0 - 149 mg/dL]      Folate 04/21/2023  16.4 [4.0 - 24.0 ng/mL]      Thiamine 10/07/2021  96 [38 - 122 ug/L]      Vit D, 25-Hydroxy 04/21/2023  45 [30 - 96 ng/mL]      Vitamin B-12 10/07/2021  380 [210 - 950 pg/mL]      Vitamin B6 2023, Apr-21    31 [5 - 50 ug/L]      Neuroendocrine/Electrolyte Screening   Phosphorus 2022, Jun-04    2.7 [2.3 - 4.7 MG/DL]      BUN 2022, Jun-04    2 (L) [5 - 25 mg/dL]      Chloride 2022, Jun-04    102 [100 - 109 mmol/L]      Creatinine 2022, Jun-04    0.74 [0.57 - 1.25 mg/dL]      Potassium 2022, Jun-04    2.6 (L) [3.5 - 5.1 mmol/L]      Sodium 2022, Jun-04    142 [136 - 145 mmol/L]            Neuroimaging:    MRI brain/head with and without contrast on 3/30/2021  Formal interpretation by Radiology:  Continued evolution of right sided corona radiata, centrum semiovale and temporal and parietal cortical infarcts with increased encephalomalacia and resolution of previously seen enhancement and near complete resolution of restricted diffusion.  Independently reviewed radiological imaging by Alvin Soria MD. MPH. Behavioral Neurologist  T1: Mild generalized cortical atrophy posterior>frontal, dorsal>ventral, lateral>medial without a clear degenerative patterning. Intact corpus callosum normal volume and ratio. Intact midbrain normal volume and ratio. Significant right greater than left hippocampal volume loss  T2/FLAIR: Multifocal cystic cavitation of the right OLIVIA/ MCA distribution right MCA/ PCA distribution and basal ganglia. cystic cavitation and evolution of the right PCA/ MCA external watershed territory. hyperintensity in the right putamen. Multifocal subcortical microvascular disease scattered throughout.  DWI/ADC: Faint residual increased signal on DWI with normal signal ADC along the margins of some of the corona radiata infarcts.  SWI/GRE: No Significant hypointensities to suggest cortical/subcortical hemosiderin deposition.  Impression: : atypical multifocal subcortical white matter changes with cystic cavitation predominantly in the right hemisphere with occasional hyperintensity in the right cystic cavitation of the external watershed territories in the right hemisphere with atypical right putamen hyperintensity. Scattered subcortical microvascular changes in the anterior temporal structures.    Brain Fluorodeoxyglucose-positron emission tomography on 4/28/2023  Formal interpretation by Radiology:  1. No areas of abnormal activity to suggest a progressive neurodegenerative process. 2. Chronic right MCA distribution infarcts with expected decreased metabolic activity within the infarcted  regions.  Independently reviewed radiological imaging by Alvin Soria MD. MPH. Behavioral Neurologist  Impression: : There is corresponding decreased metabolic activity within the infarcted regions. Otherwise, there is symmetric metabolic activity within the cerebral and cerebellar hemispheres with no other significant focal or diffuse areas of abnormal activity.     Procedures:    Electrocardiogram on 2/2/2021  Formal interpretation:  Vent. Rate : 062 BPM     Atrial Rate : 062 BPM    P-R Int : 116 ms          QRS Dur : 134 ms     QT Int : 416 ms       P-R-T Axes : 063 098 064 degrees    QTc Int : 422 ms Normal sinus rhythm Right bundle branch block Abnormal ECG  Independently reviewed Electrocardiogram by Alvin Soria MD. MPH. Behavioral Neurologist  Impression: : Received ECG has no evidence of sinus node disease. HR (>=50-60). Prolonged HI interval (>0.22 s). Broad QRS complex (> 0.12 s).     Clinical Summary:     The patient is a 58-year-old right-handed male with a relevant past medical history of ETOH abuse, HTN, HLD, MDD, CVA, who presents reporting a 2-year history of step-wise progressive neurocognitive impairment.       The clinical history is suggestive of:  Memory Impairment: STM encoding impairment, LTM encoding-retrieval impairment, Amnesia of fixation  Attention Impairment: Attention, Alertness, Selective attention, Sustained attention, Shifting attention  Executive Impairment: Energization, Working Memory, Set-Shifting, Response Inhibition  Language Impairment: Language Dysfunction  Visuospatial Impairment: Allocentric Spatial Processing  Motor/Coordination Impairment: Sensory motor integration, Motor weakness  Behavior Impairment: Neurovegetative, Emotional Regulation, Self-Preservation Dysregulation  Psychiatric Impairment: Social Coherence, Mood Regulation, Stimulation Dysregulation  Medical Review of Systems Impairment: Autonomic Dysfunction  iADL Impairment: [place] Instrumental  Activities of Daily Living Scale  The neurological examination is significant for:  Cerebellar Dysfunction: truncal ataxia (walking)  Cortical Frontal Dysfunction: agrammatic aphasia (syntax comprehension, syntactically complex comprehension)  Executive Impairment: thought disorder  Movement Disorder (Gait): strength (difficulty rising), abnormal features (speed, stride/cycle, abnormal swing, difficulty turning)  Movement Disorder (Hypokinetic): parkinsonism (tone, bradykinesia), dyskinesia (slowing, hypometria, dysrhythmia)  Sensory Dysfunction: hearing (diminished)  The neurocognitive battery is significant (based on age and education) for:  Executive predominant multidomain major cognitive impairment in 2023  BEHAV5+ 4/6: See ROS section for a full description  The neurologically relevant imaging is significant for  MRI brain/head with and without contrast (3/30/2021): atypical multifocal subcortical white matter changes with cystic cavitation predominantly in the right hemisphere with occasional hyperintensity in the right cystic cavitation of the external watershed territories in the right hemisphere with atypical right putamen hyperintensity. Scattered subcortical microvascular changes in the anterior temporal structures.  Brain Fluorodeoxyglucose-positron emission tomography (4/28/2023): There is corresponding decreased metabolic activity within the infarcted regions. Otherwise, there is symmetric metabolic activity within the cerebral and cerebellar hemispheres with no other significant focal or diffuse areas of abnormal activity.        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     The patient's clinical presentation meets the criteria for Dementia (McJoseyann, et al. 2011 Alzheimer's & Dementia).     Concern regarding an  intraindividual change in cognition diagnosed through a combination of history and objective cognitive assessment  Impairment in two or more cognitive domains  Interference with independence in functional abilities.  Represents a decline from previous levels of functioning.  Not explained by delirium or major psychiatric disorder     The patient's clinical syndrome is best described as Vascular Dementia (VaD) (Jeffrey et al., 1993).  Evidence of dementia.  Evidence of significant and/or strategic cerebrovascular disease  The onset of dementia within 3 months following a recognized stroke o abrupt deterioration in cognitive functions  Fluctuating, stepwise progression of cognitive deficits.  The early presence of gait disturbance (small-step gait or marche a petits pas, or magnetic, apraxic-ataxic or parkinsonian gait), unsteadiness, and frequent, unprovoked falls.  Personality and mood changes, abulia, depression, emotional incontinence, or other subcortical deficits including psychomotor retardation and abnormal executive function.     At present, all neurodegenerative diseases can only be diagnosed with 100% certainty through a brain autopsy. The suspected neuropathology underlying the patient's neurocognitive impairment is likely a mixture of pathologies (Lewy body disease/alpha-synucleinopathy, Vascular Contributions to Cognitive Impairment and Dementia).  There are no plasma protein biomarkers available on record.  There are no CSF protein biomarkers available on record.  There are no dermatological protein biomarkers available on record.  There is no relevant genetic biomarkers available on record.        The observations made above, were discussed with the patient and their supporting historian(s) (wife). We have discussed the additional diagnostic(s) and/or managenent below.     Care Management Plan:    #Diagnostic Screening for reversible forms of neurocognitive disorders  We recommend screening for  reversible causes of neurocognitive impairment with plasma laboratories  We have ordered plasma CBC, CMP, Vitamins (B1, B9, B12), Mg, RPR, MMA, TSH, T4, Nfl  We recommend screening for anatomical CNS lesions/neurodegenerative patterns  We have ordered an MRI brain without contrast - Dementia Protocol  We have ordered an EEG  #Diagnostic Screening for measurable forms of neurodegenerative pathology.  We have discussed opportunities for biomarker testing (CSF Bernardo biomarkers, IDEAs Amyloid-PET, Syn-One skin biopsy).  We scheduled a skin biopsy for assessment of Syn-One alpha-synuclein related pathology  #Optimize Neurocognitive Impairment and Quality  We have discussed the MIND Diet and other lifestyle behavior that may help maintain brain health.  We have provided written/digital reading material  #Optimize Behavioral Management and Quality.  No indication for memantine at this time  Recommend decrease sertraline to 50 mg  Continue buspar 15 mg BID  Next appt will cross taper one of these medications for lamictal  #Optimize Sleep Hygiene and Quality  We discussed and recommended additional diagnostic/management of sleep disorder to optimize brain health and longevity.  We have placed referrals to sleep medicine due to signs and symptoms suggestive of sleep apnea.  Next appt will switch trazodone for belsomra  #Optimize Cerebrovascular Health.  The patient has a documented history of hyperlipidemia and/or hypercholesteremia with long-term complications such as cerebrovascular disease, peripheral vascular disease, and/or aortic atherosclerosis. Collectively these risk factors may contribute to cerebral atherosclerosis, and cerebral hypoperfusion compounded neurocognitive disorder. We discussed maximizing cerebrovascular-related medical therapy, including but not limited to cholesterol medications and antiplatelet agents. We have discussed the value of aggressively controlling vascular risk factors like hypertension,  "hyperlipidemia, and Diabetes SBP<130, LDL<100, and A1C<7.0. We discussed the need to optimize lifestyle choices, including a heart-healthy diet (e.g., Mediterranean or DASH), increased cardiovascular exercise (goal 150 minutes of moderate-intensity per week), and staying cognitively and socially active.  Start aspirin 81 mg  Start Lipitor 40 mg  #Behavioral/Environmental Strategies  We recommend engaging in activities that stimulate cognitively and socially while avoiding excessive stimulation and fatigue in overwhelmingly complex situations.  We recommend integrating routine and schedule into your daily life. https://www.alzheimersproject.org/news/the-importance-of-routine-and-familiarity-to-persons-with-dementia/  #Health Maintenance/Lifestyle Advice  We have discussed the value in aggressively controlling vascular risk factors like hypertension, hyperlipidemia, and Diabetes SBP<130, LDL<100, A1C<7.0.  We discussed the need to optimize lifestyle choices including a heart-healthy diet (e.g., Mediterranean or DASH), increased cardiovascular exercise (goal 150 minutes of moderate-intensity per week), and stay cognitively and socially active.  We recommend the MIND diet, a combination of two healthy diets: the Mediterranean diet and the DASH (Dietary Approaches to Stop Hypertension) diet, and includes a variety of brain-friendly foods to optimize cognitive health and longevity.  #Support  We all need support sometimes. Get easy access to local resources, community programs, and services. https://www.communityresourcefinder.org/  Learn more about Cognitive Impairment in [place]: https://www.alz.org/professionals/public-health/state-overview/[place]  #Safety  The [employer] administers the nationwide "Safe Return" program with identification bracelets, necklaces, or clothing tags and 24-hour assistance. More information is available online at " https://www.alz.org/help-support/caregiving/safety/medicalert-with-24-7-wandering-support  #Follow up:  Follow-up in 4 weeks (Sep 2023).    Thank you for allowing us to participate in the care of your patient. Please do not hesitate to contact us with any questions or concerns.     It was a pleasure seeing The patient and we look forward to seeing them at their follow-up visit.     This note is dictated on M*Modal Fluency Direct word recognition program. There are word recognition mistakes that are occasionally missed on review.         Scheduled Follow-up :  Future Appointments   Date Time Provider Department Center   8/21/2023 11:00 AM NEURODIAGNOSTICS, APPT [facility] NEURODS [facility]   8/22/2023  3:30 PM [facility] MRI2 [facility] MRI [place]   9/1/2023  1:30 PM Alvin Johnston MD [facility] NEURO8 [facility]   11/2/2023  9:15 AM Mark Martinez MD [facility] CARDIO [place]       After Visit Medication List :     Medication List            Accurate as of August 9, 2023  3:43 PM. If you have any questions, ask your nurse or doctor.                CONTINUE taking these medications      amLODIPine 10 MG tablet  Commonly known as: NORVASC  Take 1 tablet (10 mg total) by mouth once daily.     aspirin 81 MG EC tablet  Commonly known as: ECOTRIN  Take 1 tablet (81 mg total) by mouth once daily.     atorvastatin 40 MG tablet  Commonly known as: LIPITOR  TAKE 1 TABLET BY MOUTH ONCE DAILY IN THE EVENING     busPIRone 15 MG tablet  Commonly known as: BUSPAR  Take 1 tablet by mouth three times daily as needed     cetirizine 10 MG tablet  Commonly known as: ZYRTEC     ONE-A-DAY MEN'S 50 PLUS(VIT K) 400- mcg Tab  Generic drug: multivit-min-folic-vit K-lycop     sertraline 100 MG tablet  Commonly known as: ZOLOFT  Take 1.5 tablets (150 mg total) by mouth once daily.     sildenafiL 100 MG tablet  Commonly known as: VIAGRA     traZODone 50 MG tablet  Commonly known as: DESYREL  Take 1 tablet (50 mg total) by mouth every evening.               Signing Physician:  Alvin Johnston MD    Billing:      -----------------------------------------------------------------------------    I spent a total of 140 minutes (time-in: 13:00 PM; time-out: 15:20 PM) on 2023-08-09, in person face-to-face with the patient and caregiver(s), >50% of that time was spent counseling regarding the symptoms, treatment plan, risks, therapeutic options, lifestyle modifications, and/or safety issues for the diagnoses above.    10/14 Review of Systems completed and is negative except as stated above in HPI (Systems reviewed: Const, Eyes, ENT, Resp, CV, GI, , MSK, Skin, Neuro)    I reviewed previous labs for a total of 5 minutes on 2023-08-09. This is directly related to the face-to-face encounter. Review of previous labs was performed all negative except as stated above in HPI    I reviewed previous diagnostic testing for a total of 5 minutes on 2023-08-09. This is directly related to the face-to-face encounter. A review of previous diagnostic testing was performed was noted to be within normal limits except as is stated above in HPI    I performed a neurobehavioral status examination that included a clinical assessment of thinking, reasoning, and judgment. Please see above HPI and ROS for full details. This exam was performed on 2023-08-09 and included 12 minutes spent on direct face-to-face clinical observation and interview with the patient and 21 minutes spent interpreting test results and preparing the report. The total time of 33 minutes spent on the neurobehavioral status examination is not included in the time spent on evaluation and management coding.    Total Billing time spent on encounter/documentation for this patient's evaluation and management, not including the neurobehavioral status examination: 138 minutes.

## 2023-08-11 LAB — VIT B12 SERPL-MCNC: 394 NG/L (ref 180–914)

## 2023-08-12 LAB — TREPONEMA PALLIDUM IGG+IGM AB [PRESENCE] IN SERUM OR PLASMA BY IMMUNOASSAY: NONREACTIVE

## 2023-08-14 LAB
NEUROFILAMENT LIGHT CHAIN, PLASMA: 20.3 PG/ML
PHOSPHO-TAU (181P): 0.66 PG/ML (ref 0–0.95)

## 2023-08-15 LAB
METHYLMALONATE SERPL-SCNC: 0.17 NMOL/ML
METHYLMALONATE SERPL-SCNC: 0.36 UMOL/L
VIT B1 BLD-MCNC: 85 UG/L (ref 38–122)

## 2023-08-21 ENCOUNTER — HOSPITAL ENCOUNTER (OUTPATIENT)
Dept: NEUROLOGY | Facility: CLINIC | Age: 58
Discharge: HOME | End: 2023-08-21
Payer: MEDICAID

## 2023-08-21 DIAGNOSIS — F03.90 MAJOR NEUROCOGNITIVE DISORDER: ICD-10-CM

## 2023-08-21 NOTE — PROCEDURES
VIDEO ELECTROENCEPHALOGRAM  REPORT    DATE OF SERVICE:August 21, 2023  EEG NUMBER: [id number]   REQUESTED BY:  Alvin Johnston MD  LOCATION OF SERVICE:  outpatient    METHODOLOGY   Electroencephalographic (EEG) recording is with electrodes placed according to the International 10-20 placement system.  Thirty two (32) channels of digital signal (sampling rate of 512/sec) including T1 and T2 was simultaneously recorded from the scalp and may include  EKG, EMG, and/or eye monitors.  Recording band pass was 0.1 to 512 hz.  Digital video recording of the patient is simultaneously recorded with the EEG.  The patient is instructed report clinical symptoms which may occur during the recording session.  EEG and video recording is stored and archived in digital format.  Activation procedures which include photic stimulation, hyperventilation and instructing patients to perform simple task are done in selected patients.   The EEG is displayed on a monitor screen and can be reviewed using different montages.  Computer assisted analysis is employed to detect spike and electrographic seizure activity.   The entire record is submitted for computer analysis.  The entire recording is visually reviewed and the times identified by computer analysis as being spikes or seizures are reviewed again.  Compresses spectral analysis (CSA) is also performed on the activity recorded from each individual channel.  This is displayed as a power display of frequencies from 0 to 30 Hz over time.   The CSA is reviewed looking for asymmetries in power between homologous areas of the scalp and then compared with the original EEG recording.     [employer] software was also utilized in the review of this study.  This software suite analyzes the EEG recording in multiple domains.  Coherence and rhythmicity is computed to identify EEG sections which may contain organized seizures.  Each channel undergoes analysis to detect presence of spike and sharp waves which  have special and morphological characteristic of epileptic activity.  The routine EEG recording is converted from spacial into frequency domain.  This is then displayed comparing homologous areas to identify areas of significant asymmetry.  Algorithm to identify non-cortically generated artifact is used to separate eye movement, EMG and other artifact from the EEG.      ELECTROENCEPHALOGRAM:    Indication: 58 year old male history of mild vascular dementia.     State of Consciousness:   Awake and drowsy    Background:   The background organization is fair and the record is symmetric and continuous.  The AP gradient is fairly well maintained.  There is a 8 hz posterior dominant rhythm present at maximum alertness.  Intermittently, there is generalized theta range slowing noted.         Sleep:   The patient reaches stage 1 sleep with attenuation of the posterior dominant rhythm, bilateral theta activity, and vertex waves noted. The patient does not enter stage 2 sleep    Epileptiform Abnormalities  None    EKG:   Regular rate and rhythm on single lead EKG    Activating procedures:   - Hyperventilation: not performed (patient age)  - Photic stimulation: no epileptiform discharges noted    Events:   None    Impression:   This is an abnormal awake and sleep EEG due to the presence of mild intermittent background slowing consistent with mild global cerebral dysfunction of nonspecific etiology.  No epileptiform discharges are noted.     Blessing Garcia MD  [facility]   Department of Neurology/Epilepsy

## 2023-08-22 ENCOUNTER — TELEPHONE (OUTPATIENT)
Dept: NEUROLOGY | Facility: CLINIC | Age: 58
End: 2023-08-22
Payer: MEDICAID

## 2023-08-22 ENCOUNTER — HOSPITAL ENCOUNTER (OUTPATIENT)
Dept: RADIOLOGY | Facility: HOSPITAL | Age: 58
Discharge: HOME | End: 2023-08-22
Attending: PSYCHIATRY & NEUROLOGY
Payer: MEDICAID

## 2023-08-22 DIAGNOSIS — F03.90 MAJOR NEUROCOGNITIVE DISORDER: ICD-10-CM

## 2023-08-22 PROCEDURE — 70553 MRI BRAIN DEMYELINATING W/ WO CONTRAST: ICD-10-PCS | Mod: 26,,, | Performed by: RADIOLOGY

## 2023-08-22 PROCEDURE — A9585 GADOBUTROL INJECTION: HCPCS | Mod: PO | Performed by: PSYCHIATRY & NEUROLOGY

## 2023-08-22 PROCEDURE — 70553 MRI BRAIN STEM W/O & W/DYE: CPT | Mod: 26,,, | Performed by: RADIOLOGY

## 2023-08-22 PROCEDURE — 25500020 PHARM REV CODE 255: Mod: PO | Performed by: PSYCHIATRY & NEUROLOGY

## 2023-08-22 PROCEDURE — 70553 MRI BRAIN STEM W/O & W/DYE: CPT | Mod: TC,PO

## 2023-08-22 RX ORDER — GADOBUTROL 604.72 MG/ML
10 INJECTION INTRAVENOUS
Status: COMPLETED | OUTPATIENT
Start: 2023-08-22 | End: 2023-08-22

## 2023-08-22 RX ADMIN — GADOBUTROL 8 ML: 604.72 INJECTION INTRAVENOUS at 03:08

## 2023-08-22 NOTE — PROGRESS NOTES
"SUBJECTIVE:   Neelam is a 68 year old who presents for Preventive Visit.      8/22/2023     8:23 AM   Additional Questions   Roomed by Marley navarro       Are you in the first 12 months of your Medicare coverage?  No    Healthy Habits:     In general, how would you rate your overall health?  Good    Duration of exercise:  30-45 minutes    Do you usually eat at least 4 servings of fruit and vegetables a day, include whole grains    & fiber and avoid regularly eating high fat or \"junk\" foods?  Yes    Taking medications regularly:  Yes    Medication side effects:  No significant flushing and Muscle aches    Ability to successfully perform activities of daily living:  No assistance needed    Home Safety:  Throw rugs in the hallway    Hearing Impairment:  Difficulty following a conversation in a noisy restaurant or crowded room, need to ask people to speak up or repeat themselves and difficulty understanding soft or whispered speech    In the past 6 months, have you been bothered by leaking of urine?  No    In general, how would you rate your overall mental or emotional health?  Good    Additional concerns today:  No        Have you ever done Advance Care Planning? (For example, a Health Directive, POLST, or a discussion with a medical provider or your loved ones about your wishes): Yes, advance care planning is on file.       Fall risk  Fallen 2 or more times in the past year?: Yes  Any fall with injury in the past year?: No    Cognitive Screening   1) Repeat 3 items (Leader, Season, Table)    2) Clock draw: NORMAL  3) 3 item recall: Recalls 3 objects  Results: 3 items recalled: COGNITIVE IMPAIRMENT LESS LIKELY    Mini-CogTM Copyright AUNDREA Harrell. Licensed by the author for use in [facility]; reprinted with permission (sukhjinder@.[email or web address]). All rights reserved.      Do you have sleep apnea, excessive snoring or daytime drowsiness? : yes    Reviewed and updated as needed this visit by clinical staff   Tobacco  Allergies  " NEUROPSYCHOLOGY CONSULT (TELEHEALTH)    Referral Information  Name: Palmer Guerra  MRN: [id number]  : 1965  Age: 57 y.o.  Race: White  Gender: male  Referring Provider: Ruthann Mejia NP  Billing: See below for details as coding/billing has changed   Telemedicine:   The patient location is: [place], [place]  The provider location is: [place], [place]  The chief complaint leading to consultation/medical necessity is: Cognitive concerns  Visit type: Virtual visit with synchronous audio only (telephone)  Total time spent with patient: 50 minutes  The reason for the audio only service rather than synchronous audio and video virtual visit was related to technical difficulties or patient preference/necessity.     Each patient to whom I provide medical services by telemedicine is:  (1) informed of the relationship between the physician and patient and the respective role of any other health care provider with respect to management of the patient; and (2) notified that they may decline to receive medical services by telemedicine and may withdraw from such care at any time. Patient verbally consented to receive this service via voice-only telephone call.    This service was not originating from a related E/M service provided within the previous 7 days nor will  to an E/M service or procedure within the next 24 hours or my soonest available appointment.  Prevailing standard of care was able to be met in this audio-only visit.    Consent/Emergency Plan: The patient expressed an understanding of the purpose of the evaluation and consented to all procedures. I informed the patient of limits to confidentiality and discussed an emergency plan.    SUMMARY/TREATMENT PLAN   Results from the interview indicate the following diagnoses and treatment plan recommendations. The patient is not able to follow a treatment plan without help from family.    Diagnoses/Plan:  Problem List Items Addressed This Visit          Neuro     Cognitive and behavioral changes       Psychiatric    Alcohol abuse, in remission    Anxiety     Summary/Recommendations:  Mr. Guerra and his family reported cognitive and behavioral change following a series of strokes (early and late subacute right MCA and MCA-PCA watershed territory infarcts in January 2021). Initial assessment suggested difficulty in the context of reduced performance validity and repeat assessment was recommended. They reported ongoing cognitive changes that have been gradually progressive (worse in the evening); he continues to report ongoing anxiety/ruminative thoughts. They noted improvement in mood lability and agitation and abstinence from alcohol use since November 2022. They reported ongoing physical symptoms (e.g., balance difficulty, choking when swallowing, visual perception difficulty). He is dependent in instrumental activities of daily living and has significant apathy that impacts his willingness/initiation of hygiene tasks.     Mr. Guerra will complete a neuropsychological evaluation on 3/29/2023.    Thank you for allowing me to participate in Mr. Guerra' care.  If you have any questions, please contact me at [phone].     Ally Ch, Ph.D., ABPP  Board Certified in Clinical Neuropsychology  [facility] - Department of Neurology    HISTORY OF PRESENT ILLNESS AND CURRENT SYMPTOMS     Mr. Guerra has active problems noted below. He completed an initial evaluation in August 2021, which suggested possible deficits that could not be reliably interpreted due to reduced performance validity. Since that time, they reported improvement overall with medication adjustment but ongoing anxiety/mind racing, trouble sleeping, reduced alcohol use, and ongoing visual deficits (+ L hemianopsia with neglect).    8/24/2021 note: Mr. Palmer Guerra is a 57 y.o., male with 12 years of education who was referred for a neuropsychological evaluation due to cognitive difficulties  Meds      Soc Hx        Reviewed and updated as needed this visit by Provider                 Social History     Tobacco Use     Smoking status: Never     Passive exposure: Never     Smokeless tobacco: Never   Substance Use Topics     Alcohol use: Yes     Comment: rare             8/22/2023     8:25 AM   Alcohol Use   Prescreen: >3 drinks/day or >7 drinks/week? Not Applicable     Do you have a current opioid prescription? No  Do you use any other controlled substances or medications that are not prescribed by a provider? None          PROBLEMS TO ADD ON...   Seeing Dr Soares for endocrine evaluation  Type 2 diabetes  Feet  Immunizations  Thyroid disease   Lost partner 6 months ago     Current providers sharing in care for this patient include:   Patient Care Team:  Flori Carney MD as PCP - General  Flori Carney MD as Assigned PCP    The following health maintenance items are reviewed in Epic and correct as of today:  Health Maintenance   Topic Date Due     Pneumococcal Vaccine: 65+ Years (3 - PPSV23 if available, else PCV20) 12/15/2021     MEDICARE ANNUAL WELLNESS VISIT  05/27/2023     INFLUENZA VACCINE (1) 09/01/2023     EYE EXAM  11/10/2023     LIPID  12/12/2023     MICROALBUMIN  12/12/2023     DIABETIC FOOT EXAM  12/12/2023     ASTHMA ACTION PLAN  12/12/2023     A1C  12/16/2023     ASTHMA CONTROL TEST  02/22/2024     BMP  06/16/2024     TSH W/FREE T4 REFLEX  06/16/2024     FALL RISK ASSESSMENT  08/22/2024     COLORECTAL CANCER SCREENING  03/18/2025     MAMMO SCREENING  08/08/2025     ADVANCE CARE PLANNING  06/02/2027     DTAP/TDAP/TD IMMUNIZATION (3 - Td or Tdap) 05/27/2032     DEXA  01/10/2035     HEPATITIS C SCREENING  Completed     PHQ-2 (once per calendar year)  Completed     ZOSTER IMMUNIZATION  Completed     COVID-19 Vaccine  Completed     IPV IMMUNIZATION  Aged Out     MENINGITIS IMMUNIZATION  Aged Out     BP Readings from Last 3 Encounters:   08/22/23 114/68   04/05/23  132/78   12/12/22 134/82    Wt Readings from Last 3 Encounters:   08/22/23 102.5 kg (226 lb)   04/05/23 101.8 kg (224 lb 6.4 oz)   12/12/22 102.3 kg (225 lb 9.6 oz)                  Current Outpatient Medications   Medication Sig Dispense Refill     albuterol (PROAIR HFA/PROVENTIL HFA/VENTOLIN HFA) 108 (90 Base) MCG/ACT inhaler Inhale 2 puffs into the lungs every 6 hours 8.5 g 3     Ascorbic Acid 1000 MG TABS 1000 mg daily       aspirin 81 MG tablet Take 81 mg by mouth daily with food       blood glucose (NO BRAND SPECIFIED) lancets standard Use to test blood sugar 1 times daily or as directed Dx Code E11.9 100 each 1     blood glucose (ONETOUCH ULTRA) test strip USE TO TEST BLOOD SUGAR 1 TIME DAILY. DX CODE E11.9 100 strip 1     blood glucose monitoring (ONE TOUCH ULTRA 2) meter device kit        Calcium-Vitamin D-Vitamin K 500-100-40 MG-UNT-MCG CHEW Take 2 tablets by mouth daily       Chromium 200 MCG CAPS        DHEA 10 MG TABS        ERGOCALCIFEROL 10,000 Units daily.       fluconazole (DIFLUCAN) 100 MG tablet Take 1 tablet (100 mg) by mouth as needed 90 tablet 2     fluocinolone (SYNALAR) 0.01 % external cream Apply topically 2 times daily 60 g 3     hydrochlorothiazide (HYDRODIURIL) 25 MG tablet TAKE 1 TABLET BY MOUTH EVERY DAY 90 tablet 3     Lancets (ONETOUCH DELICA PLUS [id number]) MISC        lisinopril (ZESTRIL) 20 MG tablet Take 1 tablet (20 mg) by mouth daily 90 tablet 3     Magnesium 400 MG CAPS Take 400 mg by mouth daily.       Milk Thistle 175 MG CAPS        Omega-3 Fatty Acids (OMEGA-3 EPA FISH OIL PO) Take  by mouth. 350-400 mg cap  1 grm daily       omeprazole (PRILOSEC) 20 MG DR capsule One capsule on Sunday and Thursday 24 capsule 11     simvastatin (ZOCOR) 20 MG tablet Take 1 tablet (20 mg) by mouth At Bedtime 90 tablet 3     thyroid (ARMOUR) 60 MG tablet Take 3 tablets (180 mg) by mouth daily 270 tablet 3     zinc gluconate 30 MG TABS Take 1 capsule by mouth daily       B Complex Vitamins  in the setting of early and late subacute right MCA and MCA-PCA watershed territory infarcts with areas of developing cortical laminar necrosis, discovered in January 2021, two and a half weeks after they moved into their new home. Mr. Guerra has been following with neurology since March 2021 due to strokes. Performance on a brief mental status screening measure was reduced (Jerardo Cognitive Assessment [MoCA]=14/30). His physical functioning has improved (some residual finger weakness in his left hand), but his cognitive symptoms have not. This report has been updated since the original intake appointment (8/16/21) and any new information is listed within.     Mr. Guerra has active problems noted below.    Cognitive Symptoms:  Type/Examples:   Attention: Forgets that he is watching a show; loses interest. They reported stability over time.   Mental Speed: Latency in responding to others/questions is slow. He denied changes. His wife reported cognitive speed is slower in the evenings.   Memory: Prior to his strokes being identified, he was forgetting ice chest several times on the way to work and losing his glasses/phone constantly. 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 Got lost in a parking lot one week prior to stroke. Wife reported that, post-stroke, while she was with her son in the hospital (July 2021) he asked her every day how [place] was (she was not in [place]). His memory difficulty has worsened since the stroke. They reported stability in memory over time, with greater difficulty at night.  Language:  No problems reported. He said his mind goes so fast, his words cannot keep up. His wife said he does not speak quickly and does not talk a lot at all. When you ask him a question, he may not answer. He said he is thinking about the information and trying to think of what to say. Talking makes  "(VITAMIN-B COMPLEX) TABS Take 1 tablet by mouth daily (Patient not taking: Reported on 8/22/2023)       Calcium Carbonate Antacid (TUMS PO)  (Patient not taking: Reported on 8/22/2023)       guaiFENesin-codeine (ROBITUSSIN AC) 100-10 MG/5ML solution Take 5-10 mLs by mouth every 4 hours as needed for cough (Patient not taking: Reported on 8/22/2023) 180 mL 0             8/22/2023     8:27 AM   Breast CA Risk Assessment (FHS-7)   Do you have a family history of breast, colon, or ovarian cancer? No / Unknown         Mammogram Screening: Recommended annual mammography  Pertinent mammograms are reviewed under the imaging tab.    Review of Systems   Constitutional:  Negative for chills and fever.   HENT:  Positive for hearing loss. Negative for congestion, ear pain and sore throat.    Eyes:  Negative for pain and visual disturbance.   Respiratory:  Positive for shortness of breath. Negative for cough.    Cardiovascular:  Positive for palpitations. Negative for chest pain and peripheral edema.   Gastrointestinal:  Negative for abdominal pain, constipation, diarrhea, heartburn, hematochezia and nausea.   Breasts:  Negative for tenderness, breast mass and discharge.   Genitourinary:  Negative for dysuria, frequency, genital sores, hematuria, pelvic pain, urgency, vaginal bleeding and vaginal discharge.   Musculoskeletal:  Positive for arthralgias and myalgias. Negative for joint swelling.   Skin:  Negative for rash.   Neurological:  Positive for paresthesias. Negative for dizziness and weakness.   Psychiatric/Behavioral:  Positive for mood changes. The patient is nervous/anxious.      Left lower back  Bunions - needs podiatry referral   Scalp itching     OBJECTIVE:   /68   Pulse 72   Temp 97.8  F (36.6  C) (Tympanic)   Resp 18   Ht 1.734 m (5' 8.25\")   Wt 102.5 kg (226 lb)   LMP  (LMP Unknown)   SpO2 98%   Breastfeeding No   BMI 34.11 kg/m   Estimated body mass index is 34.11 kg/m  as calculated from the " "him tired.  Visuospatial/Perceptual: Having trouble with his vision since stroke was identified (See "Sensory" for further account of vision difficulty). He regularly misplaces his cup, even when it is in front of him. His wife said he is legally blind now and does not have peripheral vision.  Executive Functioning: Difficulty with multitasking beginning a couple weeks before stroke. They reported this is stable.  Onset/Course: Sudden onset a couple of weeks prior to stroke identification, with worsening in symptoms acutely after stroke identification. Symptoms seem to come in cycles (get worse and then a little better). His home health therapist contacted wife that he was regressing in his cognition.   His wife said his medications are stabilized, and he is doing better but has confusion sometimes, particularly in unfamiliar places or at night.    Current Functional Status/Needs:  ADLs  Self-Care Eating Safety Other   His wife said she gets excited when he takes a shower, about once every 6 weeks. He said he is concerned about falling. He does not see a need to brush his teeth. He does it independently, when he does. He said he chokes when he is eating, which has been going on for awhile but has not been addressed recently. His wife said he has not wandered on the property since December 2022. Wandering "cycles," according to his wife. He visits others on a schedule to reduce the interest in wandering.      Instrumental IADLs:   Driving Medications/Health Appts Household Finances   He is not driving.   Wife responsible for all medications; he missed two mornings recently. He had been doing well for awhile before then. Limited; he sweeps the floor and takes out the trash. He helps to clean the cat litter. He is no longer cooking or using a lawnmower. Wife has always handled finances       Psychiatric/Behavioral Symptoms:  Mood:  Depression/Dysphoria Anxiety/Fearfulness Irritability   None reported    He reported his " "following:    Height as of this encounter: 1.734 m (5' 8.25\").    Weight as of this encounter: 102.5 kg (226 lb).  Physical Exam  GENERAL: healthy, alert and no distress  EYES: Eyes grossly normal to inspection, PERRL and conjunctivae and sclerae normal  HENT: ear canals and TM's normal, nose and mouth without ulcers or lesions  NECK: no adenopathy, no asymmetry, masses, or scars and thyroid normal to palpation  RESP: lungs clear to auscultation - no rales, rhonchi or wheezes  CV: regular rate and rhythm, normal S1 S2, no S3 or S4, no murmur, click or rub, no peripheral edema and peripheral pulses strong  ABDOMEN: soft, nontender, no hepatosplenomegaly, no masses and bowel sounds normal  MS: left lower back tenderness   SKIN: right gluteal SK; excema of scalp  NEURO: Normal strength and tone, mentation intact and speech normal  PSYCH: mentation appears normal, affect normal/bright  Diabetic foot exam: normal DP and PT pulses, no trophic changes or ulcerative lesions, and normal sensory exam        ASSESSMENT / PLAN:       ICD-10-CM    1. Medicare annual wellness visit, subsequent  Z00.00       2. Yeast infection  B37.9 fluconazole (DIFLUCAN) 100 MG tablet      3. Essential hypertension  I10 lisinopril (ZESTRIL) 20 MG tablet     Lipid Profile     UA reflex to Microscopic     UA reflex to Microscopic     Lipid Profile      4. Gastroesophageal reflux disease with esophagitis without hemorrhage  K21.00 omeprazole (PRILOSEC) 20 MG DR capsule      5. Hyperlipidemia, unspecified hyperlipidemia type  E78.5 simvastatin (ZOCOR) 20 MG tablet      6. Acquired hypothyroidism  E03.9 thyroid (ARMOUR) 60 MG tablet      7. Type 2 diabetes mellitus without complication, without long-term current use of insulin (H)  E11.9 blood glucose (NO BRAND SPECIFIED) lancets standard     blood glucose (ONETOUCH ULTRA) test strip     Lipid Profile     UA reflex to Microscopic     UA reflex to Microscopic     Lipid Profile      8. Bilateral " "mind is constantly thinking in an anxious manner. His wife said when he feels confused, he appears more anxious. This has improved over the last two months. He feels frustrated with himself if he cannot complete activities.      Behavior:  Agitation/Resistance Delusions/Paranoia Hallucinations   None reported None reported; he said he is learning to accept that he cannot do what he used to do. The other day, he thought he saw his wife in a chair on the porch, but she was in the house. One night, he thought he saw a bear. This is only at night over the last approximately 4-6 months.     Apathy/Motivation Repetitive/Restlessness Impulsivity/Compulsivity   Significant apathy and loss of motivation. He speaks very minimally. None reported  They reported less impulsivity. He tells his wife before he goes for a walk.     Neurovegetative:  Sleep/Nighttime  Appetite/Diet  Energy   He reported his sleep has been "good." His wife said he snores and is a restless sleeper. He acts out his dreams "at least once a week." He will think he was awake and he wasn't; he uses a Fitbit to show him that he was sleeping. He has not had a sleep study because he is not interested in wearing a machine.  He still has a sweet tooth. When he is confused, he may refuse to eat.   Fatigues easily (physically and mentally); talking tires him out.      Physical Symptoms: They reported changes in balance and coordination. He will fall over if he is on his hands and knees; he feels dizzy when going "up and down." His gait is off-balance. He is interested in physical therapy.  Autonomic Dysfunction: Since stroke gets easily overheated (possible heat stroke in 2012 but did not seek care)  Sensory: Saw Dr. Thomas (4/1/21): Mr. Guerra had a full field to his left based on confrontation testing. This would correspond with the location of his strokes away from the visual pathways. Confrontation testing revealed probable loss of visual field down and to his " "bunions  M21.611 Orthopedic  Referral    M21.612       9. Need for vaccination for pneumococcus  Z23 GH IMM-  PNEUMOCOCCAL VACCINE,ADULT,SQ OR IM      10. Other eczema  L30.8 fluocinolone (SYNALAR) 0.01 % external cream             Last labs from June reviewed  Lipids and UA today  Medications reviewed  Referral to podiatry  Pneumonia vaccine today - then discussed vaccines this fall   Grief reaction discussed   Follow up with chiropractor for her back   Prescription for synalar for scalp eczema    Contacts updated       COUNSELING:  Reviewed preventive health counseling, as reflected in patient instructions      BMI:   Estimated body mass index is 34.11 kg/m  as calculated from the following:    Height as of this encounter: 1.734 m (5' 8.25\").    Weight as of this encounter: 102.5 kg (226 lb).         She reports that she has never smoked. She has never been exposed to tobacco smoke. She has never used smokeless tobacco.      Appropriate preventive services were discussed with this patient, including applicable screening as appropriate for cardiovascular disease, diabetes, osteopenia/osteoporosis, and glaucoma.  As appropriate for age/gender, discussed screening for colorectal cancer, prostate cancer, breast cancer, and cervical cancer. Checklist reviewing preventive services available has been given to the patient.    Reviewed patients plan of care and provided an AVS. The Basic Care Plan (routine screening as documented in Health Maintenance) for Neelam meets the Care Plan requirement. This Care Plan has been established and reviewed with the Patient.          KANDI MASTERS MD  [facility]    Identified Health Risks:    " "right in both eyes. I found no ocular misalignment to explain diplopia. Both eyes themselves appeared healthy. He will return to me as requested. He is legally blind. No hearing or vision changes recently.    PERTINENT BACKGROUND INFORMATION   SOCIAL HISTORY  Family Status:  to wife since 1998; three children   Current Living Situation: Wife and three boys in the home; they have chickens outside  Primary Source of Support: Wife  Daily Activities: He does not engage in many activities; in the last three weeks, he has been going with his wife to her volunteer position  Stressors: financial concerns  Other Factors:  Educational Level: 12 years ed., graduated high school  Occupational Status and History: Currently on long term-disability; , loads petroleum products on barges, through pipe lines etc.   Other:    Family History   Problem Relation Age of Onset    Hypertension Mother     Macular degeneration Mother     Hypertension Maternal Grandmother     Hearing loss Maternal Grandmother     Cataracts Maternal Grandmother     Glaucoma Neg Hx     Retinal detachment Neg Hx      Family Neurologic History: Maternal grandmother may have had dementia prior to death  Family Psychiatric History: His mother has anxiety and panic attacks    Updated/Relevant Psychiatric History: They reported concern for physical and verbal aggression in the past. They no longer have concerns. He said it takes him longer to process and accept information, so his mood is variable until "I finally get myself together."    Suicidal/Homicidal Ideation: He had some suicidal ideation in the past. They are not concerned about suicidal ideation. They have a safe for medications/bullets and a locked closet for the guns.    Substance Use: Has been drinking alcohol since around 13 years of age, daily intake around 12 per day of beer (per record review). He was abstinent while in the hospital post-stroke and started again when his son was in " "the hospital in 2021. He stopped drinking alcohol in November 2022, and it has been "going real good." His wife has been going to [employer], and he has been to Alcoholics Anonymous. Other family members also participate. No drug use reported.     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     Past Medical History:   Diagnosis Date    CVA (cerebral vascular accident) 2/2/2021    Depression     ED (erectile dysfunction)     Hypertension     Stroke     2/2/20     Past Surgical History:   Procedure Laterality Date    TONSILLECTOMY       Updated/Relevant Neurologic History:  Falls: None, but he is very fearful of falling  Headaches/Migraines: None  TBI: None   Seizures: None  Stroke: CVA (January/February 2021):   Movement Concerns: None  Prior Neuropsychological Assessment:   Tests Administered: MSVT, ACS WC, Test of Premorbid Functioning (TOPF); Wechsler Adult Intelligence Scale, Fourth Edition (WAIS-IV) [select subtests]; Repeatable Battery for the Assessment of Neuropsychological Status (RBANS, form A); Trail Making Test, parts A and B (Mikaela et al., 2004 norms); Stroop Color and Word Task; Frontal Assessment Battery; Clock Drawing; Grooved Pegboard Test; Hernandez Depression Inventory - second edition (BDI-2);and Generalized Anxiety Disorder - 7 Item Scale (MARTIN-7). Manual norms were used unless otherwise indicated.  Results/Conclusions: Mr. Guerra' neuropsychological testing was confounded by reduced performance validity, and therefore his test data cannot reliably be interpreted. In the context of reduced performance and estimated average " "pre-morbid abilities (by demographics), his performance on tasks at or near-expectation can be interpreted as a minimum level of functioning. With that said, Mr. Guerra performed near-expectation on confrontation naming, aspects of delayed memory for verbal information, visual recognition, a task of simple attention, a task of working memory, and response inhibition. On self-report mood inventories, Mr. Guerra' responses on depression and anxiety scales indicated significant depressive and anxious symptomatology. Invalidation of the current test data precludes provision of a cognitive diagnosis, however Mr. Guerra and his wife reported an onset of cognitive difficulty coinciding with infarcts on imaging and slowing on EEG. There are additional factors which are considered to exacerbate Mr. Guerra' current reported cognitive difficulties, including his significant intake of alcohol and past alcohol use history, improper management of medications, poor sleep, and severe reported levels of depression and anxiety. It would be beneficial to improve the aforementioned factors and complete follow-up testing in 6-12 months to attempt to re-classify his strengths and weaknesses. Recommendations for Mr. Guerra' continued care and well-being are provided below.   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 (cramping, difficulty walking, took a week to recover, no medical treatment)    Recent Labs  Lab Results   Component Value Date    [id number] 308 10/07/2021     No results found for: RPR  Lab Results   Component Value Date    FOLATE 11.7 10/07/2021     Lab Results   Component Value Date    TSH 2.650 02/02/2021     Lab Results   Component Value Date    HGBA1C 5.3 01/28/2021     No results found for: HIV1X2, DZB42POBD    Imaging    Results for orders placed or performed during the hospital encounter of 03/31/21   MRI Brain W WO Contrast    " Narrative    EXAMINATION:  MRI BRAIN W WO CONTRAST    CLINICAL HISTORY:  Stroke, follow up; hx strokes, dizziness, near syncopal episode witnessed by family and blank stare for a few minutes, denies trauma, surg, MS, CA.  Stroke versus seizure--does not have a hx of seizures    TECHNIQUE:  Multiplanar multisequence MR imaging of the brain was performed before and after the administration of 14 mL Clariscan intravenous contrast.    COMPARISON:  MRI brain with without contrast, 02/02/2021.  MRA brain without contrast, 02/02/2021. CT head without contrast, 01/29/2021.    FINDINGS:  INTRACRANIAL: Evolving multiple now chronic right basal ganglia, corona radiata and centrum semiovale infarcts and encephalomalacia as well as right temporal and parietal cortical infarcts.  The previously seen enhancement associated with these areas has resolved.  Faint residual increased signal on DWI with normal signal ADC along the margins of some of the corona radiata infarcts.  FLAIR signal abnormality corresponding to the lesions along the periphery with volume loss centrally.  Additional scattered T2 FLAIR hyperintense white matter foci are unchanged, likely related to chronic microvascular ischemic change.  Tiny focus of susceptibility in the right parietal lobe is unchanged from prior study.  Hippocampal formations are symmetric and normal in size and signal.  No extra-axial fluid collection or mass.  No intracranial mass effect.  No hydrocephalus.  6 mm pineal cyst.  Otherwise, midline structures have a normal configuration.  Visualized pituitary gland and infundibulum are normal.  At least moderate stenosis of the distal right M1 MCA segment with decreased caliber of the distal MCA branches relative to the contralateral side.  Note this was occluded on prior MRA.    SINUSES: Mild bilateral ethmoid sinus mucosal thickening.  Trace left mastoid fluid.    ORBITS: Visualized orbits are normal.      Impression    1. Continued  evolution of right sided corona radiata, centrum semiovale and temporal and parietal cortical infarcts with increased encephalomalacia and resolution of previously seen enhancement and near complete resolution of restricted diffusion.  2. Persistent at least moderate stenosis of the distal right M1 MCA segment with decreased caliber of the distal right MCA branches.  3. No evidence of hippocampal sclerosis      Electronically signed by: Aguilar Gibbs MD  Date:    03/31/2021  Time:    14:24   Results for orders placed or performed during the hospital encounter of 02/02/21   MRA Brain    Narrative    EXAMINATION:  MRA BRAIN WITHOUT CONTRAST    CLINICAL HISTORY:  Stroke, follow up;    TECHNIQUE:  Non-contrast 3-D time-of-flight intracranial MR angiography was performed through the [place] [facility] [place] with MIP reformatting.    COMPARISON:  MRI of the brain 02/02/2021    FINDINGS:  Anterior circulation: The petrous and the cavernous internal carotid arteries bilaterally are patent without any filling defects or any aneurysmal dilatation.    There is a complete occlusion of the horizontal segment of the right middle cerebral artery with nonvisualization of the distal M1 as well as the M2 segments of the right middle cerebral artery territory.  The A1 segment of the right anterior cerebral artery and the A2 segment of the anterior cerebral artery are widely patent.    The left middle cerebral artery and the anterior cerebral artery demonstrate no occlusive disease or any filling defects or dissections.  No aneurysms of the left MCA trifurcation or the anterior communicating artery.  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  Small caliber right posterior communicating artery    In the posterior circulation a dominant left  vertebral artery.  No occlusive disease of the intracranial vertebral arteries or the basilar artery.  No posterior fossa aneurysms identified.  The superior cerebellar arteries and the posteroinferior cerebellar arteries demonstrate no significant abnormalities.      Impression    1. 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  2. MRA of the [id number] otherwise is unremarkable.  This report was flagged in Epic as abnormal.      Electronically signed by: Reuben Sandoval MD  Date:    02/03/2021  Time:    07:54   Results for orders placed or performed during the hospital encounter of 01/29/21   CT Head Without Contrast    Narrative    EXAMINATION:  CT HEAD WITHOUT CONTRAST    CLINICAL HISTORY:  Altered mental status; Disorientation, unspecified    TECHNIQUE:  Routine unenhanced axial images were obtained through the head.  Sagittal and coronal reformatted images were created.  The study is reviewed in bone and soft tissue windows.    COMPARISON:  None    FINDINGS:  Intracranial contents: There is no prior study for comparison.  There is no intracranial hemorrhage.  There is no hydrocephalus or midline shift.  There is, however, patchy hypodensity in the right frontal subcortical white matter as well as in the right corona radiata and extending more cephalad into the right parietal subcortical white matter.  The findings are nonspecific and age indeterminate but may reflect recent nonhemorrhagic ischemic disease.  Clinical correlation is needed.  The gray-white interface is otherwise preserved.  There is no abnormal extra-axial fluid collection.  The basilar cisterns are open.  The cerebellar tonsils are normal position.    Extracranial contents, calvarium, soft tissues: The calvarium is normal.  The paranasal sinuses and mastoid air cells are clear.      Impression    1. There is no hemorrhage, hydrocephalus or midline shift but there is  nonspecific and age indeterminate patchy white matter hypodensity in the right frontal and parietal subcortical white matter as well as in the right corona radiata.  These findings may reflect recent nonhemorrhagic ischemic disease which could be further evaluated with MRI.  This report was flagged in Epic as abnormal.      Electronically signed by: Angel Tierney MD  Date:    01/29/2021  Time:    16:48     EEG (4/9/21):   Findings:The short-term video EEG recording during wakefulness contains 8Hz alpha activity over the posterior head regions. There is focal right hemispheric 3-6 Hz delta to theta slowing, maximal frontotemporal and to a lesser degree centroparietal. No abnormal activity occurred during photic stimulation. Hyperventilation was not performed. During the recording, the patient became drowsy but did not fall asleep.     Impression: The EKG channel was unremarkable. The short-term video EEG shows focal right hemispheric slowing, maximal right frontotemporal. This is indicative of focal disturbance of cerebral function.  No potentially epileptiform activity was seen.     Current Outpatient Medications:     amLODIPine (NORVASC) 10 MG tablet, Take 1 tablet (10 mg total) by mouth once daily., Disp: 30 tablet, Rfl: 11    aspirin (ECOTRIN) 81 MG EC tablet, Take 1 tablet (81 mg total) by mouth once daily., Disp: 30 tablet, Rfl: 3    atorvastatin (LIPITOR) 40 MG tablet, TAKE 1 TABLET BY MOUTH ONCE DAILY IN THE EVENING, Disp: 90 tablet, Rfl: 1    busPIRone (BUSPAR) 15 MG tablet, Take 1 tablet by mouth three times daily as needed, Disp: 40 tablet, Rfl: 0    cetirizine (ZYRTEC) 10 MG tablet, Take 10 mg by mouth once daily., Disp: , Rfl:     doxepin (SINEQUAN) 10 MG capsule, Take 1 capsule (10 mg total) by mouth every evening., Disp: 30 capsule, Rfl: 11    multivit-min-folic-vit K-lycop (ONE-A-DAY MEN'S 50 PLUS) 400- mcg Tab, Take 1 tablet by mouth once daily., Disp: , Rfl:     sertraline (ZOLOFT) 100 MG  "tablet, Take 1 tablet (100 mg total) by mouth once daily., Disp: 90 tablet, Rfl: 3    sildenafiL (VIAGRA) 100 MG tablet, Take 100 mg by mouth daily as needed for Erectile Dysfunction., Disp: , Rfl:   No current facility-administered medications for this visit.    Facility-Administered Medications Ordered in Other Visits:     neomycin-bacitracin-polymyxin packet, , , PRN, Erich Larson MD, 1 packet at 04/10/14 0968    MENTAL STATUS AND OBSERVATIONS:  APPEARANCE: Not assessed  ALERTNESS/ORIENTATION: Attentive and alert. Oriented to month, year, and day of the week but not date; fully oriented to location  GAIT: Not assessed  MOTOR MOVEMENTS/MANNERISMS: Not assessed  SPEECH/LANGUAGE: Normal in rate, rhythm, tone, and volume; did not engage in spontaneous conversation. No significant word finding difficulty noted. Receptive and expressive language were within normal limits.  STATED MOOD/AFFECT: The patients stated mood was "good." Affect was consistent with the interview topic.  INTERPERSONAL BEHAVIOR: Rapport was quickly established   SUICIDALITY/HOMICIDALITY: Denied  HALLUCINATIONS/DELUSIONS: None evidenced or endorsed  THOUGHT PROCESSES/INSIGHT: Thoughts seemed logical and goal-directed.     BILLING  Service Description CPT Code Minutes Units   Psychiatric diagnostic evaluation by physician [phone]   Neurobehavioral status exam by physician [id number]  0   Each additional hour by physician [id number]  0     "

## 2023-08-22 NOTE — TELEPHONE ENCOUNTER
----- Message from Alvin Johnston MD sent at 8/22/2023  4:44 PM CDT -----  Hi,  Please schedule the patient for a video follow-up to discuss test results and care management.  Alvin Hill

## 2023-08-23 ENCOUNTER — TELEPHONE (OUTPATIENT)
Dept: NEUROLOGY | Facility: CLINIC | Age: 58
End: 2023-08-23
Payer: MEDICAID

## 2023-08-23 NOTE — TELEPHONE ENCOUNTER
----- Message from Ruthann Mejia NP sent at 8/22/2023  4:53 PM CDT -----  Contact: Dr. Elizabeth\Jaimee  Who is Dr. Elizabeth?? Not listed as PCP. What is this in regards to?     ----- Message -----  From: Nancy Lemons RN  Sent: 8/22/2023   4:22 PM CDT  To: Ruthann Mejia NP      ----- Message -----  From: Julia Chiu  Sent: 8/22/2023   3:54 PM CDT  To: Roberto Hermosillo was calling on behalf of Dr. Elizabeth. Requests a call back from Dr. Mejia regarding this pt. Dr. Elizabeth cell#[phone]    Thanks  TS

## 2023-08-23 NOTE — TELEPHONE ENCOUNTER
----- Message from Gino Barriga sent at 8/23/2023  3:52 PM CDT -----  Contact: Jaimee/ Dr. Elizabeth Office  Jaimee/ Dr. Elizabeth Office is calling to inform Dr. Elizabeth is requesting a call from Dr. Mejia to discuss patient, please call [phone].         Thanks

## 2023-08-23 NOTE — TELEPHONE ENCOUNTER
I called and left a message for Dr. Elizabeth stating my name and that I received a call but did not have a release of information to provide any additional information. I gave my number to call back and will talk with Dr. Elizabeth, if we have a release of information form giving permission.    ----- Message from Marquita Escalante sent at 8/22/2023  4:00 PM CDT -----  Regarding: pt advice  Contact: Dr. Elizabeth [phone]  Ms. Hermosillo from Dr. Elizabeth's office requesting that Dr. Ch call back at the earliest convenience. Thank you for all you are doing.

## 2023-08-24 LAB
MISCELLANEOUS TEST NAME: NORMAL
REFERENCE LAB: NORMAL
SPECIMEN TYPE: NORMAL
TEST RESULT: NORMAL

## 2023-08-29 ENCOUNTER — TELEPHONE (OUTPATIENT)
Dept: NEUROLOGY | Facility: CLINIC | Age: 58
End: 2023-08-29
Payer: MEDICAID

## 2023-08-29 NOTE — TELEPHONE ENCOUNTER
Called pt's wife and let her know Dr Johnston said that's fine and he will discuss results at biopsy Friday  She verbalized understanding and had no further concerns or questions.

## 2023-08-29 NOTE — TELEPHONE ENCOUNTER
Called pt's wife to schedule f/u for test results  She said at their last appt Dr Johnston said he would review pt's test results when they come in for the Syn One this week.   I let her know I'll check with the doctor   She verbalized understanding and had no further concerns or questions.

## 2023-08-29 NOTE — TELEPHONE ENCOUNTER
Call placed to patient and family regarding Skin Biopsy scheduled for Friday.  Voicemail left.  Instructed to stop all blood thinning medication including ASA, Ibuprofen, and Naproxen and to wear comfortable clothing.  Procedure explained.  All questions answers.

## 2023-09-01 ENCOUNTER — PROCEDURE VISIT (OUTPATIENT)
Dept: NEUROLOGY | Facility: CLINIC | Age: 58
End: 2023-09-01
Payer: MEDICAID

## 2023-09-01 VITALS
BODY MASS INDEX: 27.26 KG/M2 | DIASTOLIC BLOOD PRESSURE: 77 MMHG | SYSTOLIC BLOOD PRESSURE: 126 MMHG | HEIGHT: 69 IN | WEIGHT: 184.06 LBS | HEART RATE: 74 BPM

## 2023-09-01 DIAGNOSIS — G20.C PARKINSONISM, UNSPECIFIED PARKINSONISM TYPE: Primary | ICD-10-CM

## 2023-09-01 PROCEDURE — 11105 PUNCH BX SKIN EA SEP/ADDL: CPT | Mod: PBBFAC | Performed by: PSYCHIATRY & NEUROLOGY

## 2023-09-01 PROCEDURE — 11104 PUNCH BX SKIN SINGLE LESION: CPT | Mod: S$PBB,,, | Performed by: PSYCHIATRY & NEUROLOGY

## 2023-09-01 PROCEDURE — 11104 PUNCH BX SKIN SINGLE LESION: CPT | Mod: PBBFAC | Performed by: PSYCHIATRY & NEUROLOGY

## 2023-09-01 PROCEDURE — 11105 PR PUNCH BIOPSY, SKIN, EA ADDTL LESION: ICD-10-PCS | Mod: S$PBB,,, | Performed by: PSYCHIATRY & NEUROLOGY

## 2023-09-01 PROCEDURE — 11105 PUNCH BX SKIN EA SEP/ADDL: CPT | Mod: S$PBB,,, | Performed by: PSYCHIATRY & NEUROLOGY

## 2023-09-01 PROCEDURE — 11104 PR PUNCH BIOPSY, SKIN, SINGLE LESION: ICD-10-PCS | Mod: S$PBB,,, | Performed by: PSYCHIATRY & NEUROLOGY

## 2023-09-01 NOTE — PROCEDURES
[facility]  Brain Health and Cognitive [facility] Program     PATIENT: Palmer Guerra  VISIT DATE: 2023  MRN: [id number]  PRIMARY PROVIDER: Nigel Calvillo MD  : 1965    PRE-OP DIAGNOSIS: parkinsonism  POST-OP DIAGNOSIS: Same   PROCEDURE: skin punch biopsy    Performing Physician: Alvin Johnston MD.  Supervising Physician (if applicable): N/A     PROCEDURE:   _  Shave Biopsy  X  Punch Biopsy  _  Incisional Biopsy    DESCRIPTION:  - Punch Size: 0.5 cm  - Number of Punch Biopsies: 3  + CPT 22304 (punch biopsy, neck) 1st procedure  + CPT 94491 (punch biopsy, each additional lesion, thigh) 2nd procedure  + CPT 76777 (punch biopsy, each additional lesion, ankle) 3rd procedure    The area surrounding the skin lesion was prepared and draped in the  usual sterile manner. The lesion was removed in the usual manner by punch biopsy method noted above. Three full thickness cylindrical samples of the skin were removed from the upper back, lower thigh, and lower leg. The intent of the biopsy is to remove a sample of a cutaneous lesion for Syn-One diagnostic pathologic examination. Hemostasis was assured. The patient tolerated  the procedure well.     Closure:       _  Monsels for hemostasis                _  Suture   X Simple closure  _ None    Followup: The patient tolerated the procedure well without  complications.  Standard post-procedure care is explained and return  precautions are given.    Pathology/biopsy specimens were sent directly to [facility] CLIA-Certified [facility]ology [facility] for processing. Pathology was not sent via in-house [facility] laboratory. Pathology results will be returned within 4-6 weeks and scanned into [facility] Electronic Medical Record.

## 2023-09-06 ENCOUNTER — TELEPHONE (OUTPATIENT)
Dept: NEUROLOGY | Facility: CLINIC | Age: 58
End: 2023-09-06
Payer: MEDICAID

## 2023-09-06 NOTE — TELEPHONE ENCOUNTER
----- Message from Lauren Meraz sent at 9/6/2023  4:28 PM CDT -----  Regarding: pt advice  Contact: pt [phone]  PATIENT CALL    Pt's wife is calling to speak with someone in provider's office regarding his most recent stroke and would like a call back. She's unsure if he needs another appt, please call pt at [phone].

## 2023-09-06 NOTE — TELEPHONE ENCOUNTER
----- Message from Lauren Meraz sent at 9/6/2023  4:32 PM CDT -----  Regarding: appt access  Contact: pt [phone]  PATIENT CALL    Pt's wife Jannette is calling to speak with someone in provider's office regarding scheduling a sleep study and would like a call back. Referral in epic, no appts available. Please call her at [phone]

## 2023-09-07 ENCOUNTER — TELEPHONE (OUTPATIENT)
Dept: NEUROLOGY | Facility: CLINIC | Age: 58
End: 2023-09-07
Payer: MEDICAID

## 2023-09-07 DIAGNOSIS — E53.8 B12 DEFICIENCY: Primary | ICD-10-CM

## 2023-09-07 DIAGNOSIS — Z86.73 HISTORY OF STROKE: ICD-10-CM

## 2023-09-07 RX ORDER — ACETAMINOPHEN, DIPHENHYDRAMINE HCL, PHENYLEPHRINE HCL 325; 25; 5 MG/1; MG/1; MG/1
TABLET ORAL
Qty: 30 TABLET | Refills: 3 | Status: SHIPPED | OUTPATIENT
Start: 2023-09-07 | End: 2023-09-07 | Stop reason: SDUPTHER

## 2023-09-07 RX ORDER — CLOPIDOGREL BISULFATE 75 MG/1
75 TABLET ORAL DAILY
Qty: 30 TABLET | Refills: 11 | Status: SHIPPED | OUTPATIENT
Start: 2023-09-07 | End: 2023-09-07 | Stop reason: SDUPTHER

## 2023-09-07 RX ORDER — CLOPIDOGREL BISULFATE 75 MG/1
75 TABLET ORAL DAILY
Qty: 30 TABLET | Refills: 11 | Status: SHIPPED | OUTPATIENT
Start: 2023-09-07 | End: 2024-09-06

## 2023-09-07 RX ORDER — ACETAMINOPHEN, DIPHENHYDRAMINE HCL, PHENYLEPHRINE HCL 325; 25; 5 MG/1; MG/1; MG/1
TABLET ORAL
Qty: 30 TABLET | Refills: 3 | Status: SHIPPED | OUTPATIENT
Start: 2023-09-07 | End: 2024-01-16 | Stop reason: SDUPTHER

## 2023-09-07 NOTE — TELEPHONE ENCOUNTER
Dr Johnston advises he will manage these medications  Called pt's wife back to let her know  She said that she is trying to get the sleep study done for pt and she spoke w/ someone who said pt needs an order for the actual sleep study instead of ref to sleep med  I let her know we usually ref to sleep med and they place the order    She said she thinks it may be different for the [facility] and that she spoke w/ MINDA Mejia about it.  I let her know I will follow up with her to see what we need to do and get it taken care of     she verbalized understanding and had no further concerns or questions.

## 2023-09-07 NOTE — TELEPHONE ENCOUNTER
----- Message from Lauren Meraz sent at 9/6/2023  4:23 PM CDT -----  Regarding: refill request  Contact: pt [phone]  Rx REFILL REQUEST    Refill or New Rx:new rx    RX Name and Strength:PLAVIX // sublingual b12     Preferred Pharmacy with phone number:  [address] [address]  Phone: [phone] Fax: [phone]       Communication Preference: callback  Additional Information: pt's wife reports pharmacy closing at 5:30 and asking for the script to be sent ASAP

## 2023-09-07 NOTE — TELEPHONE ENCOUNTER
Called pt's wife back to let her know B12 and plavix usually come from PCP or cardiologist, and that we don't have either on active med list for pt    Lvm

## 2023-09-07 NOTE — TELEPHONE ENCOUNTER
----- Message from Sloane Beverly sent at 9/7/2023  2:26 PM CDT -----  Regarding: WRONG PHARMACY  Contact: Wife - Yana  Wife stated the pt's prescriptions was sent to the wrong pharmacy, need to re-send to the following pharmacy:      [facility] - [place], [place] - [address]   Phone: [phone]  Fax:  [phone]      clopidogreL (PLAVIX) 75 mg tablet  cyanocobalamin, vitamin B-12, 5,000 mcg Subl    Please remove the following pharmacies from the pt's chart:    [address] [address] [facility]   Phone: [phone]  Fax:  [phone]      [facility] [address] [facility]   Phone: [phone]  Fax:  [phone]    Wife ask for a call back to discuss    Contact info  [phone]

## 2023-09-07 NOTE — TELEPHONE ENCOUNTER
Called pts wife  Spoke w/ her  Confirmed pharmacy change    Editing pharmacies on chart per her request  She  verbalized understanding and had no further concerns or questions.

## 2023-09-08 NOTE — TELEPHONE ENCOUNTER
Alvin Johnston MD  You 1 hour ago (8:53 AM)     JR  No - studies need to be interpreted and if positive needs a prescription of CPAP which our clinic isnt built for. Send to sleep medicine specialist - referral was already placed   Alvin

## 2023-09-08 NOTE — TELEPHONE ENCOUNTER
"Routing to [facility] and [facility] sleep med staff w/ comment "Hey there's some confusion about pt's sleep referral. Could yall pls assist in getting pt scheduled for a [facility] sleep med appt? She is under the impression that Dr Johnston  needs to order a sleep test, but he confirmed he just places the sleep med referrals.  Thanks yall!"  "

## 2023-09-08 NOTE — TELEPHONE ENCOUNTER
Ruthann Mejia, NP  You 17 hours ago (2:57 PM)       In our region, sleep studies are coordinated through the [facility] sleep lab. I usually order my own studies because I have a background in sleep, but the other providers in this clinic just place a referral to sleep med. We do not have an [facility] sleep med provider here. The local one is Dr. Ave Dee so the referral needs to be faxed to her.     I reviewed my notes and I didn't see where we had discussed sleep testing before.

## 2023-09-27 ENCOUNTER — TELEPHONE (OUTPATIENT)
Dept: NEUROLOGY | Facility: CLINIC | Age: 58
End: 2023-09-27
Payer: MEDICAID

## 2023-09-27 NOTE — TELEPHONE ENCOUNTER
[place], [place]  You; Alvin Johnston MD 15 hours ago (8:24 PM)     [facility] Dr. Johnston.  SYN-One results have been uploaded to Media for your review.  Dahlia, would you please schedule a f/u to discuss results?  Appreciate you both, Foreign    Messaged pt and selected option to be notified if not read by Monday

## 2023-11-13 DIAGNOSIS — F41.9 ANXIETY: ICD-10-CM

## 2023-11-13 RX ORDER — SERTRALINE HYDROCHLORIDE 100 MG/1
150 TABLET, FILM COATED ORAL DAILY
Qty: 135 TABLET | Refills: 3 | Status: SHIPPED | OUTPATIENT
Start: 2023-11-13 | End: 2023-12-03 | Stop reason: SDUPTHER

## 2023-11-21 ENCOUNTER — TELEPHONE (OUTPATIENT)
Dept: NEUROLOGY | Facility: CLINIC | Age: 58
End: 2023-11-21
Payer: MEDICAID

## 2023-11-22 NOTE — TELEPHONE ENCOUNTER
----- Message from Lalita Anglin sent at 11/21/2023  3:56 PM CST -----  Regarding: Appt  Contact: Bonny [phone]  Bonny/ wife is calling to schedule a appt please call

## 2023-11-22 NOTE — TELEPHONE ENCOUNTER
Called pt's wife back  Spoke w/ her       Scheduled next avail for SYN One results       She said that she's concerned plavix isn't working well bc pt got a cut and didn't bleed much    She asked if she should have him take the aspirin on top of the plavix    I asked if he has a cardiologist bc it wasn't showing up on my end  She said yes, Dr Shell spicer     I told her she can f/u w/ him, but she said that Dr Johnston put pt on the plavix    I let her know I'll check with him and get back to her  She verbalized understanding and had no further concerns or questions.

## 2023-11-29 ENCOUNTER — TELEPHONE (OUTPATIENT)
Dept: NEUROLOGY | Facility: CLINIC | Age: 58
End: 2023-11-29
Payer: MEDICAID

## 2023-11-29 NOTE — TELEPHONE ENCOUNTER
Per Dr Johnston's request, tried to send to PCP, but they are outside of [facility]    Called pharmacy to ask if they can pls have pt put them in touch with the PCP for override    They told me that the current Sertraline Rx was signed by Dr. Johnston on 11.13.23 and asked me if we need PCP to sign a new order   I told them I will check with the provider and call back

## 2023-11-29 NOTE — TELEPHONE ENCOUNTER
Called pt's wife to review msg I sent   Spoke w/ her   She verbalized understanding and had no further concerns or questions.

## 2023-11-29 NOTE — TELEPHONE ENCOUNTER
----- Message from Lili Fraser sent at 11/28/2023  4:37 PM CST -----  Regarding: Needs Medical Advice for RX  Contact: [facility] at [phone]  Type: Needs Medical Advice  Who Called:    [facility] - [address] [address]  Phone: [phone] Fax: [phone]      Additional Information: Pharmacy calling to get to authorization to override discrepancy with medications. Please call and advise. Thank you      sertraline (ZOLOFT) 100 MG tablet 135 tablet 3 11/13/2023 11/12/2024   Sig - Route: Take 1.5 tablets (150 mg total) by mouth once daily. - Oral   Sent to pharmacy as: sertraline (ZOLOFT) 100 MG tablet   E-Prescribing Status: Receipt confirmed by pharmacy (11/13/2023  1:51 PM CST)

## 2023-11-29 NOTE — TELEPHONE ENCOUNTER
Insurance is rejecting the Zoloft because it says it interacts with Plavix. Pt is on Trazone and Buspar also.  Pharmacy needs an OK from the provider to override .  Please advise.

## 2023-12-03 DIAGNOSIS — F41.9 ANXIETY: ICD-10-CM

## 2023-12-03 RX ORDER — SERTRALINE HYDROCHLORIDE 100 MG/1
100 TABLET, FILM COATED ORAL DAILY
Qty: 30 TABLET | Refills: 1 | Status: SHIPPED | OUTPATIENT
Start: 2023-12-03 | End: 2023-12-05 | Stop reason: SDUPTHER

## 2023-12-05 DIAGNOSIS — F41.9 ANXIETY: ICD-10-CM

## 2023-12-05 RX ORDER — SERTRALINE HYDROCHLORIDE 100 MG/1
100 TABLET, FILM COATED ORAL DAILY
Qty: 30 TABLET | Refills: 1 | Status: SHIPPED | OUTPATIENT
Start: 2023-12-05 | End: 2024-01-08 | Stop reason: SDUPTHER

## 2023-12-05 NOTE — TELEPHONE ENCOUNTER
"Alvin Johnston MD  You23 minutes ago (11:06 AM)     JR  Yeah that's fine for verbal order     You  Alvin Johnston MD1 hour ago (9:40 AM)     AT  Sorry about that- I thought your name was listed on last refill. Thanks for filling it and sorry for the miscommunication  The original request was "Insurance is rejecting the Zoloft because it says it interacts with Plavix. Pt is on Trazone and Buspar also.  Pharmacy needs an OK from the provider to override .  Please advise."  Is it ok for me to give verbal on phone to advise ok to fill despite interactions?  Thanks!     "

## 2023-12-05 NOTE — TELEPHONE ENCOUNTER
Called pharmacy to let them know per DR Vickie abel to fill zoloft even with pt taking other medications  They verbalized understanding and had no further concerns or questions.

## 2024-01-08 DIAGNOSIS — F41.9 ANXIETY: ICD-10-CM

## 2024-01-08 RX ORDER — SERTRALINE HYDROCHLORIDE 100 MG/1
100 TABLET, FILM COATED ORAL DAILY
Qty: 30 TABLET | Refills: 5 | Status: SHIPPED | OUTPATIENT
Start: 2024-01-08

## 2024-01-16 ENCOUNTER — OFFICE VISIT (OUTPATIENT)
Dept: NEUROLOGY | Facility: CLINIC | Age: 59
End: 2024-01-16
Payer: MEDICAID

## 2024-01-16 DIAGNOSIS — R42 VERTIGO: ICD-10-CM

## 2024-01-16 DIAGNOSIS — G20.C PARKINSONISM, UNSPECIFIED PARKINSONISM TYPE: ICD-10-CM

## 2024-01-16 DIAGNOSIS — R45.3 APATHY: ICD-10-CM

## 2024-01-16 DIAGNOSIS — F01.50: ICD-10-CM

## 2024-01-16 DIAGNOSIS — F01.50 MIXED DEMENTIA: Primary | ICD-10-CM

## 2024-01-16 DIAGNOSIS — E53.8 B12 DEFICIENCY: ICD-10-CM

## 2024-01-16 DIAGNOSIS — I69.334 MONOPLEGIA OF UPPER LIMB FOLLOWING CEREBRAL INFARCTION AFFECTING LEFT NON-DOMINANT SIDE: ICD-10-CM

## 2024-01-16 DIAGNOSIS — F01.A18 MILD VASCULAR DEMENTIA WITH OTHER BEHAVIORAL DISTURBANCE: ICD-10-CM

## 2024-01-16 DIAGNOSIS — F02.80 MIXED DEMENTIA: Primary | ICD-10-CM

## 2024-01-16 DIAGNOSIS — G30.9 MIXED DEMENTIA: Primary | ICD-10-CM

## 2024-01-16 DIAGNOSIS — G20.A2 PARKINSON'S DISEASE WITHOUT DYSKINESIA, WITH FLUCTUATING MANIFESTATIONS: ICD-10-CM

## 2024-01-16 PROCEDURE — 99358 PROLONG SERVICE W/O CONTACT: CPT | Mod: 95,,, | Performed by: PSYCHIATRY & NEUROLOGY

## 2024-01-16 RX ORDER — PIMAVANSERIN TARTRATE 34 MG/1
34 CAPSULE ORAL NIGHTLY
Qty: 30 CAPSULE | Refills: 1 | Status: CANCELLED | OUTPATIENT
Start: 2024-01-16

## 2024-01-16 RX ORDER — ACETAMINOPHEN, DIPHENHYDRAMINE HCL, PHENYLEPHRINE HCL 325; 25; 5 MG/1; MG/1; MG/1
TABLET ORAL
Qty: 30 TABLET | Refills: 3 | Status: SHIPPED | OUTPATIENT
Start: 2024-01-16

## 2024-01-16 RX ORDER — DONEPEZIL HYDROCHLORIDE 5 MG/1
TABLET, FILM COATED ORAL
Qty: 30 TABLET | Refills: 0 | Status: SHIPPED | OUTPATIENT
Start: 2024-01-16

## 2024-01-16 NOTE — PROGRESS NOTES
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     Concern regarding an intraindividual change in cognition diagnosed through a combination of history and objective cognitive assessment  Impairment in two or more cognitive domains  Interference with independence in functional abilities.  Represents a decline from previous levels of functioning.  Not explained by delirium or major psychiatric disorder     The patient's clinical presentation has features of Mixed Dementia with features of  Dementia with Lewy Bodies (DLB) (McHerman et al., Neurology 2005) and Vascular Dementia (VaD) (Jeffrey et al., 1993)  A progressive cognitive decline that interferes with normal social or occupational function.  Fluctuating cognition with pronounced variations in attention and alertness.  Recurrent visual hallucinations.  Spontaneous motor features of parkinsonism.  REM sleep behavior disorder.  Evidence of significant and/or strategic cerebrovascular disease  The onset of dementia within 3 months following a recognized stroke o abrupt deterioration in cognitive functions  Fluctuating, stepwise progression of cognitive deficits.  The early presence of gait disturbance (small-step gait or marche a petits pas, or magnetic, apraxic-ataxic or parkinsonian gait), unsteadiness, and frequent, unprovoked falls.  Personality and mood changes, abulia, depression, emotional incontinence, or other subcortical deficits including psychomotor retardation and abnormal executive function.     The patient's clinical history indicates that the cognitive impairment is primarily attributed to stepwise progressive, multifocal strokes in the right hemisphere, leading to vascular dementia. Additionally, there now appears to be a comorbid alpha-synuclein mediated disease, along with a prodromal stage of Alzheimer's disease. This assessment is supported by the skin biopsy findings, which show evidence of alpha-synuclein, and is further characterized by associated symptoms such as hallucinations, fluctuating levels of arousal, and parkinsonism. The presence of parkinsonism and early Alzheimer's disease indicators is also corroborated by blood-based biomarkers, specifically the depressed abeta42/40 ratio. This complex clinical picture underscores the multifaceted nature of the patient's cognitive decline and necessitates a nuanced approach to treatment and management.  At present, all neurodegenerative diseases can only be diagnosed with 100%  certainty through a brain autopsy. The suspected neuropathology underlying the patient's neurocognitive impairment is likely a mixture of pathologies (Lewy body disease/alpha-synucleinopathy, Vascular Contributions to Cognitive Impairment and Dementia).  Plasma Protein Biomarkers: [08/09/2023] Neurofilament Light Chain (Nfl): 20.3.  Cerebrospinal Fluid Protein Biomarkers: [08/09/2023] Phospho-Tau (181P): 0.66.  CND [employer] Skin Biomarkers: [09/01/2023] Syn-One PC: 0 (Negative). Syn-One DT: 0 (Negative). Syn-One DL: 2 (Positive).  There is no relevant genetic biomarkers available on record.      patient presents with stepwise progressive decline last 3 years collectively consistent with vascular dementia.  Patient has multifocal infarcts is Toya predominantly in the right hemisphere.  Comorbid to this patient does have evidence very early stages of Alzheimer's disease plaque accumulation of screening biomarker testing as well as fairly mild signs and alpha synuclein process.     The observations made above, were discussed with the patient and their supporting historian(s) (wife). We have discussed the additional diagnostic(s) and/or managenent below.     Care Management Plan:    #Optimize Neurocognitive Impairment and Quality  We have discussed the MIND Diet and other lifestyle behavior that may help maintain brain health.  We have provided written/digital reading material  Start donepezil 5 mg titrate the over 1 month 10 mg  Continue vitamin B12 5000 mcg q.weekly  #Optimize Behavioral Management and Quality.  No indication for memantine at this time  Next appointment consider starting modafinil for apathy/abulia  Continue sertraline 50 mg q.day  Continue buspar 15 mg BID  Next appt will cross taper one of these medications for lamictal  #Optimize Sleep Hygiene and Quality  We discussed and recommended additional diagnostic/management of sleep disorder to optimize brain health and longevity.  f/u sleep  medicine due to signs and symptoms suggestive of sleep apnea.  Next appt will switch trazodone for belsomra  #Optimize Cerebrovascular Health.  The patient has a documented history of hyperlipidemia and/or hypercholesteremia with long-term complications such as cerebrovascular disease, peripheral vascular disease, and/or aortic atherosclerosis. Collectively these risk factors may contribute to cerebral atherosclerosis, and cerebral hypoperfusion compounded neurocognitive disorder. We discussed maximizing cerebrovascular-related medical therapy, including but not limited to cholesterol medications and antiplatelet agents. We have discussed the value of aggressively controlling vascular risk factors like hypertension, hyperlipidemia, and Diabetes SBP<130, LDL<100, and A1C<7.0. We discussed the need to optimize lifestyle choices, including a heart-healthy diet (e.g., Mediterranean or DASH), increased cardiovascular exercise (goal 150 minutes of moderate-intensity per week), and staying cognitively and socially active.  Start aspirin 81 mg  Start Lipitor 40 mg  #Optimize Movement Disorder and Quality.  next appointment consider starting Sinemet  #Behavioral/Environmental Strategies  We recommend engaging in activities that stimulate cognitively and socially while avoiding excessive stimulation and fatigue in overwhelmingly complex situations.  We recommend integrating routine and schedule into your daily life. https://www.alzheimersproject.org/news/the-importance-of-routine-and-familiarity-to-persons-with-dementia/  #Health Maintenance/Lifestyle Advice  We have discussed the value in aggressively controlling vascular risk factors like hypertension, hyperlipidemia, and Diabetes SBP<130, LDL<100, A1C<7.0.  We discussed the need to optimize lifestyle choices including a heart-healthy diet (e.g., Mediterranean or DASH), increased cardiovascular exercise (goal 150 minutes of moderate-intensity per week), and stay cognitively  "and socially active.  We recommend the MIND diet, a combination of two healthy diets: the Mediterranean diet and the DASH (Dietary Approaches to Stop Hypertension) diet, and includes a variety of brain-friendly foods to optimize cognitive health and longevity.  #Support  We all need support sometimes. Get easy access to local resources, community programs, and services. https://www.communityresourcefinder.org/  Learn more about Cognitive Impairment in [place]: https://www.alz.org/professionals/public-health/state-overview[place]  #Safety  The Alzheimer's Association administers the nationwide "Safe Return" program with identification bracelets, necklaces, or clothing tags and 24-hour assistance. More information is available online at https://www.alz.org/help-support/caregiving/safety/medicalert-with-24-7-wandering-support  #Follow up:  Follow-up as needed.    Thank you for allowing us to participate in the care of your patient. Please do not hesitate to contact us with any questions or concerns.     It was a pleasure seeing The patient and we look forward to seeing them at their follow-up visit.     This note is dictated on M*Modal Fluency Direct word recognition program. There are word recognition mistakes that are occasionally missed on review. This service was not originating from a related E/M service provided within the previous 7 days nor will  to an E/M service or procedure within the next 24 hours or my soonest available appointment. Prevailing standard of care was able to be met in this audio-only visit.     "

## 2024-01-17 ENCOUNTER — TELEPHONE (OUTPATIENT)
Dept: NEUROLOGY | Facility: CLINIC | Age: 59
End: 2024-01-17
Payer: MEDICAID

## 2024-01-17 NOTE — TELEPHONE ENCOUNTER
STEF called patient's spouse to provide support regarding care management.     She states that she really needs to talk with SW but she is on the line with the [facility] office. STEF offered to call tomorrow. She requested SW call in the afternoon. STEF will make note to call 1/18/24 afternoon.

## 2024-01-24 NOTE — TELEPHONE ENCOUNTER
"SW called patient's spouse to discuss concerns.    Hygiene    Wife reports that he hasn't showered since September- October. He is getting blackheads. He said he was scared of falling in the shower after his stroke, so family got him a shower chair. However, wife says that he does not think he needs to shower. Last time he showered, wife had initiated it by telling him that she wouldn't sleep in the bed with him unless he showered.    She states that patient does not brush his teeth, she reports his teeth are brown. She has told the patient that he needs to brush his teeth or he'll have to get dentures at this rate.     When they worked with HH, they began using a whiteboard in the bathroom with tasks (brush teeth, shower, etc.)  Sw advised her to start that strategy up again, including reminders to wash his hands after he uses the restroom. SW advised wife to implement hygiene practices to prevent sickness and potential infections (ie UTI).     Sw also advised her to use positive reinforcement for the patient bathing, as well as framing it as a way [place].     Hallucinations  She reports that he's not telling her that he's having hallucinations. She says that he sees bears; however, he says that he can recognize that it's not real. She states that he is not distressed by them. SW advised that patient's wife may have to initiate conversations about patient's experiences in order for him to share details.    Patient's wife has not other concerns to discuss at this time.  "

## 2024-02-22 ENCOUNTER — TELEPHONE (OUTPATIENT)
Dept: CARDIOLOGY | Facility: CLINIC | Age: 59
End: 2024-02-22
Payer: MEDICAID

## 2024-02-22 NOTE — PROGRESS NOTES
Subjective:    Patient ID:  Palmer Guerra is a 58 y.o. male who presents for follow-up of No chief complaint on file.      TELEMEDICINE VISIT      Problem List Items Addressed This Visit          Cardiac/Vascular    Essential hypertension - Primary    Mixed hyperlipidemia     Other Visit Diagnoses       Chronic ischemic right MCA stroke                HPI    Patient was last seen on 11/01/2021 at which time it is okay from cardiac standpoint.  Was not interested in loop recorder.  Thirty day event monitor was which showed no significant clinical arrhythmia.    Patient presents for telemedicine visit.    On assessment today, the patient states that he feels OK today.    No chest pain.  No shortness of breath.    Found out he had two more strokes after his major stroke and Lewy body     No palpitations.         Objective:   There were no vitals filed for this visit.    BP Readings from Last 5 Encounters:   09/01/23 126/77   08/09/23 120/71   04/21/23 119/68   11/09/22 (!) 146/80   09/15/22 134/71        Physical Exam  Constitutional:       General: He is not in acute distress.     Appearance: He is well-developed. He is not diaphoretic.   HENT:      Head: Normocephalic and atraumatic.   Eyes:      General: No scleral icterus.     Conjunctiva/sclera: Conjunctivae normal.   Pulmonary:      Effort: No respiratory distress.      Breath sounds: No stridor.   Musculoskeletal:         General: No signs of injury.      Cervical back: Normal range of motion.   Skin:     Coloration: Skin is not jaundiced.   Neurological:      Mental Status: He is alert. Mental status is at baseline.   Psychiatric:         Mood and Affect: Mood normal.         Behavior: Behavior normal.             Current Outpatient Medications   Medication Instructions    amLODIPine (NORVASC) 10 mg, Oral, Daily    aspirin (ECOTRIN) 81 mg, Oral, Daily    atorvastatin (LIPITOR) 40 MG tablet TAKE 1 TABLET BY MOUTH ONCE DAILY IN THE EVENING    busPIRone  (BUSPAR) 15 MG tablet Take 1 tablet by mouth three times daily as needed    cetirizine (ZYRTEC) 10 mg, Oral, Daily    clopidogreL (PLAVIX) 75 mg, Oral, Daily    cyanocobalamin, vitamin B-12, 5,000 mcg Subl Place one under tongue once a day for 1 month, then continue to take under tongue per week    donepeziL (ARICEPT) 5 MG tablet Take 1 tablet (5 mg) orally once a day in the AM for 4 weeks. On Week 4, switch to 10 mg prescription and take 1 tablet (10mg) orally once a day in the AM    multivit-min-folic-vit K-lycop (ONE-A-DAY MEN'S 50 PLUS) 400- mcg Tab 1 tablet, Oral, Daily    sertraline (ZOLOFT) 100 mg, Oral, Daily    sildenafiL (VIAGRA) 100 mg, Oral, Daily PRN    traZODone (DESYREL) 50 mg, Oral, Nightly       Lipid Panel:   Lab Results   Component Value Date    CHOL 168 03/03/2023    HDL 45 03/03/2023    LDLCALC 87 03/03/2023    TRIG 213 (H) 03/03/2023    CHOLHDL 38.0 02/03/2021       The ASCVD Risk score (Creola DK, et al., 2019) failed to calculate for the following reasons:    The patient has a prior MI or stroke diagnosis    Most Recent EKG Results  Results for orders placed or performed during the hospital encounter of 02/02/21   EKG 12-lead    Collection Time: 02/02/21  9:27 PM    Narrative    Test Reason : I63.9,    Vent. Rate : 062 BPM     Atrial Rate : 062 BPM     P-R Int : 116 ms          QRS Dur : 134 ms      QT Int : 416 ms       P-R-T Axes : 063 098 064 degrees     QTc Int : 422 ms    Normal sinus rhythm  Right bundle branch block  Abnormal ECG  When compared with ECG of 16-JAN-2009 12:48,  Vent. rate has decreased BY  34 BPM  Right bundle branch block has replaced Incomplete right bundle branch block  Confirmed by Maye ISAACS, Satinder ([id number]) on 2/3/2021 9:09:21 AM    Referred By: [email or web address]   SELF           Confirmed By:Satinder Villavicencio MD       Most Recent Echocardiogram Results  Results for orders placed during the hospital encounter of 02/02/21    Echo Color Flow Doppler? Yes    Interpretation  Summary  · The left ventricle is normal in size with concentric remodeling and normal systolic function. The estimated ejection fraction is 65%  · Normal left ventricular diastolic function.  · Normal right ventricular size with normal right ventricular systolic function.  · Negative saline contrast bubble study for interatrial communication.      Most Recent Nuclear Stress Test Results  No results found for this or any previous visit.      Most Recent Cardiac PET Stress Test Results  No results found for this or any previous visit.      Most Recent Cardiovascular Angiogram results  No results found for this or any previous visit.      Other Most Recent Cardiology Results  Results for orders placed in visit on 11/02/21    Cardiac event monitor    Interpretation Summary  · The baseline rhythm was sinus rhythm.  · Negative event monitor with no clinical arrhythmias.        All pertinent data including labs, imaging, EKGs, and studies listed above were reviewed.  Patient's most recent EKG tracing was personally interpreted by this provider.    Assessment:       1. Essential hypertension    2. Mixed hyperlipidemia    3. Chronic ischemic right MCA stroke         Plan for treatment of the above diagnoses:     Symptoms OK today  BP/Pulse unable to be assessed on telemedicine visit  Most recent echocardiogram reviewed personally      Continue amlodipine 10 mg PO Daily  Continue aspirin 81 mg PO Daily  Continue atorvastatin 40 mg PO Daily  Continue Plavix 75 mg PO Daily  30 day event monitor. If event monitor negative, will schedule placement of implantable loop recorder.  Stop Eliquis 48 hours prior to loop recorder implantation. Will only have to restart Eliquis if recurrence of aFib is appreciated on loop recorder interrogation.    This procedure has been fully reviewed with the patient.     Continue other cardiac medications  Mediterranean Diet/Cardiovascular Exercise Program    Patient queried and all questions were  answered.    F/u in 9 months to reassess    The patient location is: [place]   The chief complaint leading to consultation is:  Please see problem list above  Visit type: Virtual visit with synchronous audio and video  Total time spent with patient: 15 minutes   Each patient to whom he or she provides medical services by telemedicine is:  (1) informed of the relationship between the physician and patient and the respective role of any other health care provider with respect to management of the patient; and (2) notified that he or she may decline to receive medical services by telemedicine and may withdraw from such care at any time.    Notes: See above       Signed:    Mark Martinez MD  2/23/2024 3:23 PM

## 2024-02-23 ENCOUNTER — OFFICE VISIT (OUTPATIENT)
Dept: CARDIOLOGY | Facility: CLINIC | Age: 59
End: 2024-02-23
Payer: MEDICAID

## 2024-02-23 DIAGNOSIS — E78.2 MIXED HYPERLIPIDEMIA: ICD-10-CM

## 2024-02-23 DIAGNOSIS — I10 ESSENTIAL HYPERTENSION: Primary | ICD-10-CM

## 2024-02-23 DIAGNOSIS — Z86.73 CHRONIC ISCHEMIC RIGHT MCA STROKE: ICD-10-CM

## 2024-02-23 PROCEDURE — 99215 OFFICE O/P EST HI 40 MIN: CPT | Mod: 95,,, | Performed by: INTERNAL MEDICINE

## 2024-03-04 ENCOUNTER — HOSPITAL ENCOUNTER (OUTPATIENT)
Dept: CARDIOLOGY | Facility: HOSPITAL | Age: 59
Discharge: HOME | End: 2024-03-04
Attending: INTERNAL MEDICINE
Payer: MEDICAID

## 2024-03-04 DIAGNOSIS — Z86.73 CHRONIC ISCHEMIC RIGHT MCA STROKE: ICD-10-CM

## 2024-03-04 PROCEDURE — 93272 ECG/REVIEW INTERPRET ONLY: CPT | Mod: ,,, | Performed by: INTERNAL MEDICINE

## 2024-03-04 PROCEDURE — 93271 ECG/MONITORING AND ANALYSIS: CPT | Mod: PO

## 2024-04-09 ENCOUNTER — TELEPHONE (OUTPATIENT)
Dept: CARDIOLOGY | Facility: CLINIC | Age: 59
End: 2024-04-09
Payer: MEDICAID

## 2024-04-09 ENCOUNTER — PATIENT MESSAGE (OUTPATIENT)
Dept: CARDIOLOGY | Facility: CLINIC | Age: 59
End: 2024-04-09
Payer: MEDICAID

## 2024-04-09 DIAGNOSIS — Z86.73 CHRONIC ISCHEMIC RIGHT MCA STROKE: Primary | ICD-10-CM

## 2024-04-09 NOTE — TELEPHONE ENCOUNTER
Message left regarding Loop recorder insertion appointment of 4/15/2024. My chart message sent as well.

## 2024-04-10 ENCOUNTER — TELEPHONE (OUTPATIENT)
Dept: CARDIOLOGY | Facility: CLINIC | Age: 59
End: 2024-04-10
Payer: MEDICAID

## 2024-04-10 DIAGNOSIS — Z86.73 CHRONIC ISCHEMIC RIGHT MCA STROKE: Primary | ICD-10-CM

## 2024-04-10 DIAGNOSIS — Z95.818 STATUS POST PLACEMENT OF IMPLANTABLE LOOP RECORDER: ICD-10-CM

## 2024-04-17 ENCOUNTER — TELEPHONE (OUTPATIENT)
Dept: CARDIOLOGY | Facility: CLINIC | Age: 59
End: 2024-04-17
Payer: MEDICAID

## 2024-04-17 DIAGNOSIS — F02.80 MIXED DEMENTIA: Primary | ICD-10-CM

## 2024-04-17 DIAGNOSIS — F01.50 MIXED DEMENTIA: Primary | ICD-10-CM

## 2024-04-17 DIAGNOSIS — G30.9 MIXED DEMENTIA: Primary | ICD-10-CM

## 2024-04-17 DIAGNOSIS — G47.00 INSOMNIA, UNSPECIFIED TYPE: ICD-10-CM

## 2024-04-17 NOTE — TELEPHONE ENCOUNTER
Spoke with significant other and confirmed Loop appointment for 4/18/24 at 0745 am.Verbalized understanding.

## 2024-04-18 ENCOUNTER — HOSPITAL ENCOUNTER (OUTPATIENT)
Dept: CARDIOLOGY | Facility: HOSPITAL | Age: 59
Discharge: HOME | End: 2024-04-18
Attending: INTERNAL MEDICINE
Payer: MEDICAID

## 2024-04-18 VITALS
HEART RATE: 70 BPM | BODY MASS INDEX: 23.45 KG/M2 | HEIGHT: 69 IN | RESPIRATION RATE: 20 BRPM | DIASTOLIC BLOOD PRESSURE: 71 MMHG | WEIGHT: 158.31 LBS | SYSTOLIC BLOOD PRESSURE: 122 MMHG

## 2024-04-18 DIAGNOSIS — Z86.73 CHRONIC ISCHEMIC RIGHT MCA STROKE: ICD-10-CM

## 2024-04-18 PROCEDURE — 33285 INSJ SUBQ CAR RHYTHM MNTR: CPT | Mod: PO

## 2024-04-18 PROCEDURE — 33285 INSJ SUBQ CAR RHYTHM MNTR: CPT | Mod: ,,, | Performed by: INTERNAL MEDICINE

## 2024-04-18 PROCEDURE — C1764 EVENT RECORDER, CARDIAC: HCPCS | Mod: PO

## 2024-04-18 RX ORDER — TRAZODONE HYDROCHLORIDE 50 MG/1
50 TABLET ORAL NIGHTLY
Qty: 30 TABLET | Refills: 11 | OUTPATIENT
Start: 2024-04-18 | End: 2025-04-18

## 2024-04-25 ENCOUNTER — TELEPHONE (OUTPATIENT)
Dept: CARDIOLOGY | Facility: HOSPITAL | Age: 59
End: 2024-04-25
Payer: MEDICAID

## 2024-04-25 NOTE — TELEPHONE ENCOUNTER
----- Message from Luis Enrique Rubio MA sent at 4/25/2024  9:41 AM CDT -----    ----- Message -----  From: Chey Banks  Sent: 4/25/2024   9:31 AM CDT  To: Juan Flores Staff    Type: Needs Medical Advice  Who Called:  pt wife, Jannette  Symptoms (please be specific):  said she need to know if his device check can be rescheduled for later today--please call and advise-- said he will not be able to make it until after lunch time  Best Call Back Number: [phone] (home)     Additional Information: thank you

## 2024-04-26 ENCOUNTER — HOSPITAL ENCOUNTER (OUTPATIENT)
Dept: CARDIOLOGY | Facility: HOSPITAL | Age: 59
Discharge: HOME | End: 2024-04-26
Attending: INTERNAL MEDICINE
Payer: MEDICAID

## 2024-04-26 ENCOUNTER — TELEPHONE (OUTPATIENT)
Dept: CARDIOLOGY | Facility: HOSPITAL | Age: 59
End: 2024-04-26
Payer: MEDICAID

## 2024-04-26 DIAGNOSIS — Z86.73 CHRONIC ISCHEMIC RIGHT MCA STROKE: ICD-10-CM

## 2024-04-26 DIAGNOSIS — I10 ESSENTIAL HYPERTENSION: ICD-10-CM

## 2024-04-26 DIAGNOSIS — Z95.818 STATUS POST PLACEMENT OF IMPLANTABLE LOOP RECORDER: ICD-10-CM

## 2024-04-26 PROCEDURE — 93285 PRGRMG DEV EVAL SCRMS IP: CPT | Mod: 26,,, | Performed by: INTERNAL MEDICINE

## 2024-04-26 RX ORDER — AMLODIPINE BESYLATE 10 MG/1
10 TABLET ORAL DAILY
Qty: 30 TABLET | Refills: 2 | Status: SHIPPED | OUTPATIENT
Start: 2024-04-26 | End: 2024-04-29 | Stop reason: SDUPTHER

## 2024-04-26 NOTE — TELEPHONE ENCOUNTER
Pt. Seen in clinic today for ILR device interrogation, Pt. Reports he is out of his Amlodipine 10 mg daily and is requesting medication to be refilled and sent to [facility].

## 2024-04-29 ENCOUNTER — HOSPITAL ENCOUNTER (OUTPATIENT)
Dept: CARDIOLOGY | Facility: HOSPITAL | Age: 59
Discharge: HOME | End: 2024-04-29
Attending: INTERNAL MEDICINE
Payer: MEDICAID

## 2024-04-29 ENCOUNTER — CLINICAL SUPPORT (OUTPATIENT)
Dept: CARDIOLOGY | Facility: HOSPITAL | Age: 59
End: 2024-04-29
Payer: MEDICAID

## 2024-04-29 DIAGNOSIS — I10 ESSENTIAL HYPERTENSION: ICD-10-CM

## 2024-04-29 PROCEDURE — 93298 REM INTERROG DEV EVAL SCRMS: CPT | Mod: 26,,, | Performed by: INTERNAL MEDICINE

## 2024-04-29 PROCEDURE — 93298 REM INTERROG DEV EVAL SCRMS: CPT | Mod: PO | Performed by: INTERNAL MEDICINE

## 2024-04-29 RX ORDER — AMLODIPINE BESYLATE 10 MG/1
10 TABLET ORAL DAILY
Qty: 30 TABLET | Refills: 3 | Status: SHIPPED | OUTPATIENT
Start: 2024-04-29 | End: 2025-04-29

## 2024-05-01 LAB
OHS CV AF BURDEN PERCENT: < 1
OHS CV AF BURDEN PERCENT: < 1
OHS CV DC REMOTE DEVICE TYPE: NORMAL
OHS CV DC REMOTE DEVICE TYPE: NORMAL

## 2024-05-13 RX ORDER — TRAZODONE HYDROCHLORIDE 50 MG/1
50 TABLET ORAL NIGHTLY
Qty: 30 TABLET | Refills: 2 | Status: SHIPPED | OUTPATIENT
Start: 2024-05-13 | End: 2025-05-13

## 2024-05-22 ENCOUNTER — PATIENT MESSAGE (OUTPATIENT)
Dept: NEUROLOGY | Facility: CLINIC | Age: 59
End: 2024-05-22
Payer: MEDICAID

## 2024-05-30 ENCOUNTER — HOSPITAL ENCOUNTER (OUTPATIENT)
Dept: CARDIOLOGY | Facility: HOSPITAL | Age: 59
Discharge: HOME | End: 2024-05-30
Attending: INTERNAL MEDICINE

## 2024-05-30 ENCOUNTER — CLINICAL SUPPORT (OUTPATIENT)
Dept: CARDIOLOGY | Facility: HOSPITAL | Age: 59
End: 2024-05-30
Payer: MEDICAID

## 2024-05-30 LAB
OHS CV AF BURDEN PERCENT: < 1
OHS CV DC REMOTE DEVICE TYPE: NORMAL

## 2024-05-30 PROCEDURE — 93298 REM INTERROG DEV EVAL SCRMS: CPT | Mod: PO | Performed by: INTERNAL MEDICINE

## 2024-05-30 PROCEDURE — 93298 REM INTERROG DEV EVAL SCRMS: CPT | Mod: 26,,, | Performed by: INTERNAL MEDICINE

## 2024-06-30 ENCOUNTER — CLINICAL SUPPORT (OUTPATIENT)
Dept: CARDIOLOGY | Facility: HOSPITAL | Age: 59
End: 2024-06-30
Payer: MEDICAID

## 2024-06-30 ENCOUNTER — HOSPITAL ENCOUNTER (OUTPATIENT)
Dept: CARDIOLOGY | Facility: HOSPITAL | Age: 59
Discharge: HOME | End: 2024-06-30
Attending: INTERNAL MEDICINE

## 2024-06-30 PROCEDURE — 93298 REM INTERROG DEV EVAL SCRMS: CPT | Mod: 26,,, | Performed by: INTERNAL MEDICINE

## 2024-06-30 PROCEDURE — 93298 REM INTERROG DEV EVAL SCRMS: CPT | Mod: PO | Performed by: INTERNAL MEDICINE

## 2024-07-08 LAB
OHS CV AF BURDEN PERCENT: < 1
OHS CV DC REMOTE DEVICE TYPE: NORMAL

## 2024-07-31 ENCOUNTER — CLINICAL SUPPORT (OUTPATIENT)
Dept: CARDIOLOGY | Facility: HOSPITAL | Age: 59
End: 2024-07-31
Payer: MEDICAID

## 2024-07-31 ENCOUNTER — HOSPITAL ENCOUNTER (OUTPATIENT)
Dept: CARDIOLOGY | Facility: HOSPITAL | Age: 59
Discharge: HOME | End: 2024-07-31
Attending: INTERNAL MEDICINE

## 2024-07-31 PROCEDURE — 93298 REM INTERROG DEV EVAL SCRMS: CPT | Mod: PO | Performed by: INTERNAL MEDICINE

## 2024-08-01 LAB
OHS CV AF BURDEN PERCENT: < 1
OHS CV DC REMOTE DEVICE TYPE: NORMAL

## 2024-08-31 ENCOUNTER — CLINICAL SUPPORT (OUTPATIENT)
Dept: CARDIOLOGY | Facility: HOSPITAL | Age: 59
End: 2024-08-31
Payer: MEDICAID

## 2024-08-31 ENCOUNTER — HOSPITAL ENCOUNTER (OUTPATIENT)
Dept: CARDIOLOGY | Facility: HOSPITAL | Age: 59
Discharge: HOME | End: 2024-08-31
Attending: INTERNAL MEDICINE
Payer: MEDICAID

## 2024-08-31 PROCEDURE — 93298 REM INTERROG DEV EVAL SCRMS: CPT | Mod: PO | Performed by: INTERNAL MEDICINE

## 2024-08-31 PROCEDURE — 93298 REM INTERROG DEV EVAL SCRMS: CPT | Mod: 26,,, | Performed by: INTERNAL MEDICINE

## 2024-09-04 LAB
OHS CV AF BURDEN PERCENT: < 1
OHS CV DC REMOTE DEVICE TYPE: NORMAL

## 2024-10-01 ENCOUNTER — CLINICAL SUPPORT (OUTPATIENT)
Dept: CARDIOLOGY | Facility: HOSPITAL | Age: 59
End: 2024-10-01
Payer: MEDICAID

## 2024-10-01 ENCOUNTER — HOSPITAL ENCOUNTER (OUTPATIENT)
Dept: CARDIOLOGY | Facility: HOSPITAL | Age: 59
Discharge: HOME | End: 2024-10-01
Attending: INTERNAL MEDICINE
Payer: MEDICAID

## 2024-10-01 LAB
OHS CV AF BURDEN PERCENT: < 1
OHS CV DC REMOTE DEVICE TYPE: NORMAL

## 2024-11-01 ENCOUNTER — HOSPITAL ENCOUNTER (OUTPATIENT)
Dept: CARDIOLOGY | Facility: HOSPITAL | Age: 59
Discharge: HOME | End: 2024-11-01
Attending: INTERNAL MEDICINE
Payer: MEDICAID

## 2024-11-01 ENCOUNTER — CLINICAL SUPPORT (OUTPATIENT)
Dept: CARDIOLOGY | Facility: HOSPITAL | Age: 59
End: 2024-11-01
Payer: MEDICAID

## 2024-11-01 DIAGNOSIS — I63.9 CEREBRAL INFARCTION, UNSPECIFIED: ICD-10-CM

## 2024-11-01 PROCEDURE — 93298 REM INTERROG DEV EVAL SCRMS: CPT | Mod: PO | Performed by: INTERNAL MEDICINE

## 2024-11-05 LAB
OHS CV AF BURDEN PERCENT: < 1
OHS CV DC REMOTE DEVICE TYPE: NORMAL

## 2024-12-02 ENCOUNTER — CLINICAL SUPPORT (OUTPATIENT)
Dept: CARDIOLOGY | Facility: HOSPITAL | Age: 59
End: 2024-12-02
Payer: MEDICAID

## 2024-12-02 ENCOUNTER — HOSPITAL ENCOUNTER (OUTPATIENT)
Dept: CARDIOLOGY | Facility: HOSPITAL | Age: 59
Discharge: HOME | End: 2024-12-02
Attending: INTERNAL MEDICINE
Payer: MEDICAID

## 2024-12-02 DIAGNOSIS — I63.9 CEREBRAL INFARCTION, UNSPECIFIED: ICD-10-CM

## 2024-12-02 LAB
OHS CV AF BURDEN PERCENT: < 1
OHS CV DC REMOTE DEVICE TYPE: NORMAL

## 2024-12-02 PROCEDURE — 93298 REM INTERROG DEV EVAL SCRMS: CPT | Mod: PO | Performed by: INTERNAL MEDICINE

## 2025-01-02 ENCOUNTER — HOSPITAL ENCOUNTER (OUTPATIENT)
Dept: CARDIOLOGY | Facility: HOSPITAL | Age: 60
Discharge: HOME | End: 2025-01-02
Attending: INTERNAL MEDICINE
Payer: MEDICAID

## 2025-01-02 ENCOUNTER — CLINICAL SUPPORT (OUTPATIENT)
Dept: CARDIOLOGY | Facility: HOSPITAL | Age: 60
End: 2025-01-02
Payer: MEDICAID

## 2025-01-02 DIAGNOSIS — I63.9 CEREBRAL INFARCTION, UNSPECIFIED: ICD-10-CM

## 2025-01-02 PROCEDURE — 93298 REM INTERROG DEV EVAL SCRMS: CPT | Mod: PO | Performed by: INTERNAL MEDICINE

## 2025-01-08 LAB
OHS CV AF BURDEN PERCENT: < 1
OHS CV DC REMOTE DEVICE TYPE: NORMAL

## 2025-01-28 ENCOUNTER — TELEPHONE (OUTPATIENT)
Facility: CLINIC | Age: 60
End: 2025-01-28
Payer: MEDICAID

## 2025-01-29 NOTE — TELEPHONE ENCOUNTER
----- Message from Med Assistant Maciej sent at 1/16/2025  9:09 PM CST -----  Regarding: FW: Appt f/u  Contact: [phone]  Please schedule with Natalie  ----- Message -----  From: Alvin Johnston MD  Sent: 1/15/2025   8:18 AM CST  To: Maciej Galan  Subject: RE: Appt f/u                                     > 1 year  Re-establish with natalie to get a history of what's going on and if I need to see him for behavioral management I will  ----- Message -----  From: Maciej Galan  Sent: 1/14/2025   8:00 PM CST  To: Alvin Johnston MD  Subject: FW: Appt f/u                                     Last seen 1/2024    Schedule with you or Natalie?  ----- Message -----  From: Floresita Morin  Sent: 1/14/2025   4:47 PM CST  To: Vickie HENRANDEZ Staff  Subject: Appt f/u                                         VARGAS AU calling regarding Appointment Access  (message) for # pt wife is calling to schedule soonest appt with provider, wife states PCP advise to see neuro Dr hyman pls advise

## 2025-02-02 ENCOUNTER — CLINICAL SUPPORT (OUTPATIENT)
Dept: CARDIOLOGY | Facility: HOSPITAL | Age: 60
End: 2025-02-02
Payer: MEDICAID

## 2025-02-02 ENCOUNTER — HOSPITAL ENCOUNTER (OUTPATIENT)
Dept: CARDIOLOGY | Facility: HOSPITAL | Age: 60
Discharge: HOME | End: 2025-02-02
Attending: INTERNAL MEDICINE
Payer: MEDICAID

## 2025-02-02 DIAGNOSIS — I63.9 CEREBRAL INFARCTION, UNSPECIFIED: ICD-10-CM

## 2025-02-02 PROCEDURE — 93298 REM INTERROG DEV EVAL SCRMS: CPT | Mod: PO | Performed by: INTERNAL MEDICINE

## 2025-02-10 LAB
OHS CV AF BURDEN PERCENT: < 1
OHS CV DC REMOTE DEVICE TYPE: NORMAL

## 2025-03-05 ENCOUNTER — HOSPITAL ENCOUNTER (OUTPATIENT)
Dept: CARDIOLOGY | Facility: HOSPITAL | Age: 60
Discharge: HOME | End: 2025-03-05
Attending: INTERNAL MEDICINE
Payer: MEDICAID

## 2025-03-05 ENCOUNTER — CLINICAL SUPPORT (OUTPATIENT)
Dept: CARDIOLOGY | Facility: HOSPITAL | Age: 60
End: 2025-03-05
Payer: MEDICAID

## 2025-03-05 DIAGNOSIS — I63.9 CEREBRAL INFARCTION, UNSPECIFIED: ICD-10-CM

## 2025-03-05 PROCEDURE — 93298 REM INTERROG DEV EVAL SCRMS: CPT | Mod: 26,,, | Performed by: INTERNAL MEDICINE

## 2025-03-05 PROCEDURE — 93298 REM INTERROG DEV EVAL SCRMS: CPT | Mod: PO | Performed by: INTERNAL MEDICINE

## 2025-03-10 LAB
OHS CV AF BURDEN PERCENT: < 1
OHS CV DC REMOTE DEVICE TYPE: NORMAL

## 2025-03-30 PROBLEM — E87.3 METABOLIC ALKALOSIS: Status: ACTIVE | Noted: 2022-06-02

## 2025-03-30 PROBLEM — F02.83: Status: ACTIVE | Noted: 2024-05-28

## 2025-03-30 PROBLEM — G20.A2 PARKINSON'S DISEASE WITHOUT DYSKINESIA, WITH FLUCTUATING MANIFESTATIONS: Status: ACTIVE | Noted: 2025-01-10

## 2025-03-30 PROBLEM — G31.83: Status: ACTIVE | Noted: 2024-05-28

## 2025-03-30 PROBLEM — K59.1 FUNCTIONAL DIARRHEA: Status: ACTIVE | Noted: 2022-06-02

## 2025-03-30 PROBLEM — F33.9 RECURRENT MAJOR DEPRESSIVE DISORDER: Status: ACTIVE | Noted: 2025-01-10

## 2025-03-30 RX ORDER — SERTRALINE HYDROCHLORIDE 100 MG/1
150 TABLET, FILM COATED ORAL
COMMUNITY
Start: 2024-12-30 | End: 2025-03-31

## 2025-03-30 RX ORDER — MONTELUKAST SODIUM 10 MG/1
1 TABLET ORAL NIGHTLY
COMMUNITY
Start: 2025-01-14 | End: 2026-01-14

## 2025-03-30 RX ORDER — CLOPIDOGREL BISULFATE 75 MG/1
1 TABLET ORAL DAILY
COMMUNITY
Start: 2024-08-27

## 2025-03-30 RX ORDER — CHLORPHENIRAMINE MALEATE 12 MG/1
12 TABLET, FILM COATED, EXTENDED RELEASE ORAL
COMMUNITY
Start: 2025-01-14 | End: 2025-03-31

## 2025-03-30 RX ORDER — MEMANTINE HYDROCHLORIDE AND DONEPEZIL HYDROCHLORIDE 7; 10 MG/1; MG/1
1 CAPSULE ORAL EVERY MORNING
COMMUNITY

## 2025-03-30 RX ORDER — CLONAZEPAM 0.5 MG/1
0.25 TABLET ORAL NIGHTLY
COMMUNITY

## 2025-03-30 RX ORDER — TRIAMCINOLONE ACETONIDE 1 MG/G
CREAM TOPICAL 2 TIMES DAILY
COMMUNITY
Start: 2025-02-26 | End: 2026-02-26

## 2025-03-30 RX ORDER — TRAZODONE HYDROCHLORIDE 50 MG/1
1 TABLET ORAL NIGHTLY
COMMUNITY
Start: 2024-12-30 | End: 2025-03-31 | Stop reason: SDUPTHER

## 2025-03-30 NOTE — PROGRESS NOTES
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 intraindividual change in cognition diagnosed through a combination of history and objective cognitive assessment  Impairment in two or more cognitive domains  Interference with independence in functional abilities.  Represents a decline from previous levels of functioning.  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  Fluctuating cognition with pronounced variations in attention and alertness.  Recurrent visual hallucinations.  Spontaneous motor features of parkinsonism.  REM sleep behavior disorder.  Evidence of significant and/or strategic cerebrovascular disease  The onset of dementia within 3 months following a recognized stroke o abrupt deterioration in cognitive functions  Fluctuating, stepwise progression of cognitive deficits.  The early presence of gait disturbance (small-step gait or marche a petits pas, or magnetic, apraxic-ataxic or parkinsonian gait), unsteadiness, and frequent, unprovoked falls.  Personality and mood changes, abulia, depression, emotional incontinence, or other subcortical deficits including psychomotor retardation and abnormal executive function.     The patient's clinical history indicates that the cognitive impairment is primarily attributed to stepwise progressive, multifocal strokes in the right hemisphere, leading to vascular dementia. Additionally, there now appears to be a comorbid alpha-synuclein mediated disease, along with a prodromal stage of Alzheimer's disease. This assessment is supported by the skin biopsy findings, which show evidence of alpha-synuclein, and is further characterized by associated symptoms such as hallucinations, fluctuating levels of arousal, and parkinsonism. The presence of parkinsonism and early Alzheimer's disease indicators is also corroborated by blood-based biomarkers, specifically the depressed abeta42/40 ratio. This complex clinical picture underscores the multifaceted nature of the patient's cognitive decline and necessitates a nuanced approach to treatment and management.  At present, all neurodegenerative diseases can only be diagnosed with 100% certainty through a brain autopsy. The suspected  "neuropathology underlying the patient's neurocognitive impairment is likely a mixture of pathologies (Lewy body disease/alpha-synucleinopathy, Vascular Contributions to Cognitive Impairment and Dementia).  Plasma Protein Biomarkers: [08/09/2023] Neurofilament Light Chain (Nfl): 20.3.  Cerebrospinal Fluid Protein Biomarkers: [08/09/2023] Phospho-Tau (181P): 0.66.  D [employer] Skin Biomarkers: [09/01/2023] Syn-One PC: 0 (Negative). Syn-One DT: 0 (Negative). Syn-One DL: 2 (Positive).  There is no relevant genetic biomarkers available on record.       The patient is a 76-year-old male with a diagnosis of Parkinsons disease who presents with progressive motor symptoms, increased irritability, disrupted sleep patterns, and nocturnal pruritus. He reports a sensation of "wet feet" and uses medical marijuana regularly to manage anxiety, which he finds helpful. His wife notes increased agitation and tangential speech, particularly when the patient perceives his authority is challenged. Sleep hygiene is poor with inconsistent bedtimes, nighttime awakenings, and daytime fatigue. A history of noncompliance with recommended sleep apnea evaluation is noted. Physical exam revealed excoriation on the left forearm secondary to scratching.  The patient and his family are requesting reevaluation of current Parkinsons management and inquiring about new treatment options. Additionally, they are pursuing Social Security disability benefits, and imaging/labs have been requested in support of this application.  Referrals to sleep medicine have been reordered, along with brain MRI and labs. Education was provided on skin care and establishing a consistent sleep routine. A follow-up will be scheduled with Dr. Johnston to review results.      The observations made above, were discussed with the patient and their supporting historian(s) (wife). We have discussed the additional diagnostic(s) and/or managenent below.     Care Management " Plan:    #Optimize Neurocognitive Impairment and Quality  Continue  the MIND Diet and other lifestyle behavior that may help maintain brain health.  Continue vitamin B12 5000 mcg q.weekly  #Optimize Behavioral Management and Quality.  No indication for memantine at this time  Next appointment consider starting modafinil for apathy/abulia  Continue sertraline daily  Continue buspar daily  Next appt will cross taper one of these medications for lamictal  #Optimize Sleep Hygiene and Quality  We discussed and recommended additional diagnostic/management of sleep disorder to optimize brain health and longevity.  f/u sleep medicine due to signs and symptoms suggestive of sleep apnea.  #Optimize Cerebrovascular Health.  The patient has a documented history of hyperlipidemia and/or hypercholesteremia with long-term complications such as cerebrovascular disease, peripheral vascular disease, and/or aortic atherosclerosis. Collectively these risk factors may contribute to cerebral atherosclerosis, and cerebral hypoperfusion compounded neurocognitive disorder. We discussed maximizing cerebrovascular-related medical therapy, including but not limited to cholesterol medications and antiplatelet agents. We have discussed the value of aggressively controlling vascular risk factors like hypertension, hyperlipidemia, and Diabetes SBP<130, LDL<100, and A1C<7.0. We discussed the need to optimize lifestyle choices, including a heart-healthy diet (e.g., Mediterranean or DASH), increased cardiovascular exercise (goal 150 minutes of moderate-intensity per week), and staying cognitively and socially active.  Continue aspirin 81 mg  Continue Lipitor 40 mg  #Optimize Movement Disorder and Quality.  next appointment consider starting Sinemet  #Behavioral/Environmental Strategies  We recommend engaging in activities that stimulate cognitively and socially while avoiding excessive stimulation and fatigue in overwhelmingly complex situations.  We  "recommend integrating routine and schedule into your daily life. https://www.alzheimersproject.org/news/the-importance-of-routine-and-familiarity-to-persons-with-dementia/  #Health Maintenance/Lifestyle Advice  We have discussed the value in aggressively controlling vascular risk factors like hypertension, hyperlipidemia, and Diabetes SBP<130, LDL<100, A1C<7.0.  We discussed the need to optimize lifestyle choices including a heart-healthy diet (e.g., Mediterranean or DASH), increased cardiovascular exercise (goal 150 minutes of moderate-intensity per week), and stay cognitively and socially active.  We recommend the MIND diet, a combination of two healthy diets: the Mediterranean diet and the DASH (Dietary Approaches to Stop Hypertension) diet, and includes a variety of brain-friendly foods to optimize cognitive health and longevity.  #Support  We all need support sometimes. Get easy access to local resources, community programs, and services. https://www.communityresourcefinder.org/  Learn more about Cognitive Impairment in [place]: https://www.alz.org/professionals/public-health/state-overview/[place]  #Safety  The Alzheimer's Association administers the nationwide "Safe Return" program with identification bracelets, necklaces, or clothing tags and 24-hour assistance. More information is available online at https://www.alz.org/help-support/caregiving/safety/medicalert-with-24-7-wandering-support  #Follow up:  Follow-up with Dr. Johnston to discuss results     Thank you for allowing us to participate in the care of your patient. Please do not hesitate to contact us with any questions or concerns.     It was a pleasure seeing The patient and we look forward to seeing them at their follow-up visit.     This note is dictated on M*Modal Fluency Direct word recognition program. There are word recognition mistakes that are occasionally missed on review. This service was not originating from a related E/M service provided " within the previous 7 days nor will  to an E/M service or procedure within the next 24 hours or my soonest available appointment. Prevailing standard of care was able to be met in this audio-only visit.     Billing Statement:     I spent a total of 60 minutes (from 08:31 AM to 09:31 AM) on 03/31/2025, engaging in person in a face-to-face consultation with the patient. More than 50% of this time was devoted to counseling on symptoms, treatment plans, risks, therapeutic options, lifestyle modifications, and safety concerns related to the above diagnoses.     A Review of Systems was completed, encompassing 10 of the 14 systems. All findings were negative, except those noted in the History of Present Illness (HPI) and Review of Systems (ROS) Sections. The systems reviewed were Constitutional (Const), Eyes, Ear/Nose/Throat (ENT), Respiratory (Resp), Cardiovascular (CV), Gastrointestinal (GI), Genitourinary (), Musculoskeletal (MSK), Skin, and Neurological (Neuro).     I spent a total of 10 minutes reviewing and summarizing records from outside physicians on the day of the clinical evaluation. This review and summary were conducted as described in the History of Present Illness (HPI) and Assessment.     I performed a neurobehavioral status examination on the day of clinical evaluation that included a clinical assessment of thinking, reasoning, and judgment to ensure a comprehensive approach in managing the complex and evolving needs of the patient's neurocognitive condition. Please see above HPI and ROS for full details. This exam was performed on the day of clinical evaluation and included 20 minutes spent on direct face-to-face clinical observation and interview with the patient and 19 minutes spent interpreting test results and preparing the report. The total time of 39 minutes spent on the neurobehavioral status examination is not included in the time spent on evaluation and management coding.     Total  Billing time spent on encounter/documentation for this patient's evaluation and management, not including the Neurobehavioral Status Examination: 50 minutes.

## 2025-03-31 ENCOUNTER — LAB VISIT (OUTPATIENT)
Dept: LAB | Facility: HOSPITAL | Age: 60
End: 2025-03-31
Payer: MEDICAID

## 2025-03-31 ENCOUNTER — OFFICE VISIT (OUTPATIENT)
Facility: CLINIC | Age: 60
End: 2025-03-31
Payer: MEDICAID

## 2025-03-31 VITALS
DIASTOLIC BLOOD PRESSURE: 59 MMHG | WEIGHT: 162.25 LBS | HEART RATE: 60 BPM | SYSTOLIC BLOOD PRESSURE: 132 MMHG | BODY MASS INDEX: 24.03 KG/M2 | HEIGHT: 69 IN

## 2025-03-31 DIAGNOSIS — F02.80 MIXED DEMENTIA: Primary | ICD-10-CM

## 2025-03-31 DIAGNOSIS — F01.A18 MILD VASCULAR DEMENTIA WITH OTHER BEHAVIORAL DISTURBANCE: ICD-10-CM

## 2025-03-31 DIAGNOSIS — G20.A2 PARKINSON'S DISEASE WITHOUT DYSKINESIA, WITH FLUCTUATING MANIFESTATIONS: ICD-10-CM

## 2025-03-31 DIAGNOSIS — F41.1 GAD (GENERALIZED ANXIETY DISORDER): ICD-10-CM

## 2025-03-31 DIAGNOSIS — G30.9 MIXED DEMENTIA: Primary | ICD-10-CM

## 2025-03-31 DIAGNOSIS — R41.3 OTHER AMNESIA: ICD-10-CM

## 2025-03-31 DIAGNOSIS — F01.50 MIXED DEMENTIA: Primary | ICD-10-CM

## 2025-03-31 DIAGNOSIS — E53.8 B12 DEFICIENCY: ICD-10-CM

## 2025-03-31 DIAGNOSIS — F01.50: ICD-10-CM

## 2025-03-31 DIAGNOSIS — G47.33 OSA (OBSTRUCTIVE SLEEP APNEA): ICD-10-CM

## 2025-03-31 LAB
ABSOLUTE EOSINOPHIL (OHS): 0.17 K/UL
ABSOLUTE MONOCYTE (OHS): 0.78 K/UL (ref 0.3–1)
ABSOLUTE NEUTROPHIL COUNT (OHS): 6.31 K/UL (ref 1.8–7.7)
ALBUMIN SERPL BCP-MCNC: 4 G/DL (ref 3.5–5.2)
ALP SERPL-CCNC: 74 UNIT/L (ref 40–150)
ALT SERPL W/O P-5'-P-CCNC: 22 UNIT/L (ref 10–44)
ANION GAP (OHS): 9 MMOL/L (ref 8–16)
AST SERPL-CCNC: 21 UNIT/L (ref 11–45)
BASOPHILS # BLD AUTO: 0.07 K/UL
BASOPHILS NFR BLD AUTO: 0.7 %
BILIRUB SERPL-MCNC: 0.3 MG/DL (ref 0.1–1)
BUN SERPL-MCNC: 6 MG/DL (ref 6–20)
CALCIUM SERPL-MCNC: 9.7 MG/DL (ref 8.7–10.5)
CHLORIDE SERPL-SCNC: 106 MMOL/L (ref 95–110)
CO2 SERPL-SCNC: 27 MMOL/L (ref 23–29)
CREAT SERPL-MCNC: 1 MG/DL (ref 0.5–1.4)
ERYTHROCYTE [DISTWIDTH] IN BLOOD BY AUTOMATED COUNT: 14 % (ref 11.5–14.5)
FOLATE SERPL-MCNC: 13.6 NG/ML (ref 4–24)
GFR SERPLBLD CREATININE-BSD FMLA CKD-EPI: >60 ML/MIN/1.73/M2
GLUCOSE SERPL-MCNC: 103 MG/DL (ref 70–110)
HCT VFR BLD AUTO: 42.7 % (ref 40–54)
HGB BLD-MCNC: 13.6 GM/DL (ref 14–18)
IMM GRANULOCYTES # BLD AUTO: 0.03 K/UL (ref 0–0.04)
IMM GRANULOCYTES NFR BLD AUTO: 0.3 % (ref 0–0.5)
LYMPHOCYTES # BLD AUTO: 2.1 K/UL (ref 1–4.8)
MAGNESIUM SERPL-MCNC: 2.1 MG/DL (ref 1.6–2.6)
MCH RBC QN AUTO: 29.6 PG (ref 27–31)
MCHC RBC AUTO-ENTMCNC: 31.9 G/DL (ref 32–36)
MCV RBC AUTO: 93 FL (ref 82–98)
NUCLEATED RBC (/100WBC) (OHS): 0 /100 WBC
PLATELET # BLD AUTO: 275 K/UL (ref 150–450)
PMV BLD AUTO: 11.6 FL (ref 9.2–12.9)
POTASSIUM SERPL-SCNC: 4.1 MMOL/L (ref 3.5–5.1)
PROT SERPL-MCNC: 8 GM/DL (ref 6–8.4)
RBC # BLD AUTO: 4.6 M/UL (ref 4.6–6.2)
RELATIVE EOSINOPHIL (OHS): 1.8 %
RELATIVE LYMPHOCYTE (OHS): 22.2 % (ref 18–48)
RELATIVE MONOCYTE (OHS): 8.2 % (ref 4–15)
RELATIVE NEUTROPHIL (OHS): 66.8 % (ref 38–73)
SODIUM SERPL-SCNC: 142 MMOL/L (ref 136–145)
T PALLIDUM IGG+IGM SER QL: NEGATIVE
T4 FREE SERPL-MCNC: 0.86 NG/DL (ref 0.71–1.51)
TSH SERPL-ACNC: 1.15 UIU/ML (ref 0.4–4)
WBC # BLD AUTO: 9.46 K/UL (ref 3.9–12.7)

## 2025-03-31 PROCEDURE — 96116 NUBHVL XM PHYS/QHP 1ST HR: CPT | Mod: PBBFAC | Performed by: NURSE PRACTITIONER

## 2025-03-31 PROCEDURE — 82040 ASSAY OF SERUM ALBUMIN: CPT

## 2025-03-31 PROCEDURE — 85025 COMPLETE CBC W/AUTO DIFF WBC: CPT

## 2025-03-31 PROCEDURE — 84439 ASSAY OF FREE THYROXINE: CPT

## 2025-03-31 PROCEDURE — 84425 ASSAY OF VITAMIN B-1: CPT

## 2025-03-31 PROCEDURE — 99214 OFFICE O/P EST MOD 30 MIN: CPT | Mod: PBBFAC | Performed by: NURSE PRACTITIONER

## 2025-03-31 PROCEDURE — 84393 TAU PHOSPHORYLATED EA: CPT

## 2025-03-31 PROCEDURE — 36415 COLL VENOUS BLD VENIPUNCTURE: CPT

## 2025-03-31 PROCEDURE — 82607 VITAMIN B-12: CPT

## 2025-03-31 PROCEDURE — 83921 ORGANIC ACID SINGLE QUANT: CPT

## 2025-03-31 PROCEDURE — 86593 SYPHILIS TEST NON-TREP QUANT: CPT

## 2025-03-31 PROCEDURE — 82746 ASSAY OF FOLIC ACID SERUM: CPT

## 2025-03-31 PROCEDURE — 83735 ASSAY OF MAGNESIUM: CPT

## 2025-03-31 PROCEDURE — 82947 ASSAY GLUCOSE BLOOD QUANT: CPT

## 2025-03-31 PROCEDURE — 82234 BETA-AMYLOID 1-42 (ABETA 42): CPT

## 2025-03-31 PROCEDURE — 84443 ASSAY THYROID STIM HORMONE: CPT

## 2025-03-31 PROCEDURE — 83520 IMMUNOASSAY QUANT NOS NONAB: CPT

## 2025-03-31 PROCEDURE — 99999 PR PBB SHADOW E&M-EST. PATIENT-LVL IV: CPT | Mod: PBBFAC,,, | Performed by: NURSE PRACTITIONER

## 2025-04-02 LAB
IMMUNOLOGIST REVIEW: NORMAL
M PHOSPHO-TAU 217: 0.12 PG/ML
VIT B12 SERPL-MCNC: 528 NG/L (ref 180–914)

## 2025-04-04 LAB
W METHYLMALONIC ACID: 0.22 UMOL/L
W VITAMIN B1: 94 UG/L

## 2025-04-05 ENCOUNTER — CLINICAL SUPPORT (OUTPATIENT)
Dept: CARDIOLOGY | Facility: HOSPITAL | Age: 60
End: 2025-04-05

## 2025-04-05 ENCOUNTER — HOSPITAL ENCOUNTER (OUTPATIENT)
Dept: CARDIOLOGY | Facility: HOSPITAL | Age: 60
Discharge: HOME | End: 2025-04-05
Attending: INTERNAL MEDICINE

## 2025-04-05 DIAGNOSIS — I63.9 CEREBRAL INFARCTION, UNSPECIFIED: ICD-10-CM

## 2025-04-05 PROCEDURE — 93298 REM INTERROG DEV EVAL SCRMS: CPT | Mod: PO | Performed by: INTERNAL MEDICINE

## 2025-04-14 LAB
OHS CV AF BURDEN PERCENT: < 1
OHS CV DC REMOTE DEVICE TYPE: NORMAL

## 2025-05-06 ENCOUNTER — HOSPITAL ENCOUNTER (OUTPATIENT)
Dept: CARDIOLOGY | Facility: HOSPITAL | Age: 60
Discharge: HOME | End: 2025-05-06
Attending: INTERNAL MEDICINE

## 2025-05-06 ENCOUNTER — CLINICAL SUPPORT (OUTPATIENT)
Dept: CARDIOLOGY | Facility: HOSPITAL | Age: 60
End: 2025-05-06

## 2025-05-06 DIAGNOSIS — I63.9 CEREBRAL INFARCTION, UNSPECIFIED: ICD-10-CM

## 2025-05-06 PROCEDURE — 93298 REM INTERROG DEV EVAL SCRMS: CPT | Mod: PO | Performed by: INTERNAL MEDICINE

## 2025-05-12 LAB
OHS CV AF BURDEN PERCENT: < 1
OHS CV DC REMOTE DEVICE TYPE: NORMAL

## 2025-05-16 ENCOUNTER — PATIENT MESSAGE (OUTPATIENT)
Facility: CLINIC | Age: 60
End: 2025-05-16
Payer: MEDICAID

## 2025-06-06 ENCOUNTER — CLINICAL SUPPORT (OUTPATIENT)
Dept: CARDIOLOGY | Facility: HOSPITAL | Age: 60
End: 2025-06-06
Payer: MEDICAID

## 2025-06-06 ENCOUNTER — HOSPITAL ENCOUNTER (OUTPATIENT)
Dept: CARDIOLOGY | Facility: HOSPITAL | Age: 60
Discharge: HOME | End: 2025-06-06
Attending: INTERNAL MEDICINE
Payer: MEDICAID

## 2025-06-06 DIAGNOSIS — I63.9 CEREBRAL INFARCTION, UNSPECIFIED: ICD-10-CM

## 2025-06-06 PROCEDURE — 93298 REM INTERROG DEV EVAL SCRMS: CPT | Mod: PO | Performed by: INTERNAL MEDICINE

## 2025-06-09 LAB
OHS CV AF BURDEN PERCENT: < 1
OHS CV DC REMOTE DEVICE TYPE: NORMAL

## 2025-06-24 ENCOUNTER — TELEPHONE (OUTPATIENT)
Facility: CLINIC | Age: 60
End: 2025-06-24
Payer: MEDICAID

## 2025-06-24 DIAGNOSIS — F01.50: Primary | ICD-10-CM

## 2025-06-24 NOTE — TELEPHONE ENCOUNTER
Copied from CRM #[id number]. Topic: General Inquiry - Patient Advice  >> Jun 24, 2025 10:37 AM Mya wrote:  Type:  Needs Medical Advice    Who Called: pt  Would the patient rather a call back or a response via [employer]? Call back  Best Call Back Number: [phone]   Additional Information: needs mri order by willie on 3/31/25 to be sent to [facility]. Pt sts has appt at 12pm  and would get both referrals done at the same time

## 2025-07-07 ENCOUNTER — CLINICAL SUPPORT (OUTPATIENT)
Dept: CARDIOLOGY | Facility: HOSPITAL | Age: 60
End: 2025-07-07
Payer: MEDICAID

## 2025-07-07 ENCOUNTER — HOSPITAL ENCOUNTER (OUTPATIENT)
Dept: CARDIOLOGY | Facility: HOSPITAL | Age: 60
Discharge: HOME | End: 2025-07-07
Attending: INTERNAL MEDICINE
Payer: MEDICAID

## 2025-07-07 DIAGNOSIS — I63.9 CEREBRAL INFARCTION, UNSPECIFIED: ICD-10-CM

## 2025-07-07 PROCEDURE — 93298 REM INTERROG DEV EVAL SCRMS: CPT | Mod: PO | Performed by: INTERNAL MEDICINE

## 2025-07-14 LAB
OHS CV AF BURDEN PERCENT: < 1
OHS CV DC REMOTE DEVICE TYPE: NORMAL

## 2025-07-24 ENCOUNTER — HOSPITAL ENCOUNTER (OUTPATIENT)
Dept: RADIOLOGY | Facility: HOSPITAL | Age: 60
Discharge: HOME | End: 2025-07-24
Attending: NURSE PRACTITIONER
Payer: MEDICAID

## 2025-07-24 PROCEDURE — 70551 MRI BRAIN STEM W/O DYE: CPT | Mod: TC,PO

## 2025-07-30 ENCOUNTER — OFFICE VISIT (OUTPATIENT)
Dept: CARDIOLOGY | Facility: CLINIC | Age: 60
End: 2025-07-30
Payer: MEDICAID

## 2025-07-30 VITALS
WEIGHT: 160.69 LBS | HEIGHT: 69 IN | SYSTOLIC BLOOD PRESSURE: 126 MMHG | BODY MASS INDEX: 23.8 KG/M2 | DIASTOLIC BLOOD PRESSURE: 59 MMHG | HEART RATE: 79 BPM

## 2025-07-30 DIAGNOSIS — R01.1 SYSTOLIC MURMUR: Primary | ICD-10-CM

## 2025-07-30 DIAGNOSIS — Z79.02 LONG TERM (CURRENT) USE OF ANTITHROMBOTICS/ANTIPLATELETS: ICD-10-CM

## 2025-07-30 DIAGNOSIS — Z95.818 STATUS POST PLACEMENT OF IMPLANTABLE LOOP RECORDER: ICD-10-CM

## 2025-07-30 DIAGNOSIS — R94.31 NONSPECIFIC ABNORMAL ELECTROCARDIOGRAM (ECG) (EKG): ICD-10-CM

## 2025-07-30 DIAGNOSIS — Z86.73 HISTORY OF STROKE: Chronic | ICD-10-CM

## 2025-07-30 DIAGNOSIS — E78.2 MIXED HYPERLIPIDEMIA: Chronic | ICD-10-CM

## 2025-07-30 DIAGNOSIS — Z72.0 TOBACCO ABUSE: Chronic | ICD-10-CM

## 2025-07-30 DIAGNOSIS — I10 ESSENTIAL HYPERTENSION: Chronic | ICD-10-CM

## 2025-07-30 DIAGNOSIS — F01.A18 MILD VASCULAR DEMENTIA WITH OTHER BEHAVIORAL DISTURBANCE: Chronic | ICD-10-CM

## 2025-07-30 PROCEDURE — 3074F SYST BP LT 130 MM HG: CPT | Mod: CPTII,S$GLB,, | Performed by: INTERNAL MEDICINE

## 2025-07-30 PROCEDURE — 3008F BODY MASS INDEX DOCD: CPT | Mod: CPTII,S$GLB,, | Performed by: INTERNAL MEDICINE

## 2025-07-30 PROCEDURE — 99204 OFFICE O/P NEW MOD 45 MIN: CPT | Mod: S$GLB,,, | Performed by: INTERNAL MEDICINE

## 2025-07-30 PROCEDURE — 3078F DIAST BP <80 MM HG: CPT | Mod: CPTII,S$GLB,, | Performed by: INTERNAL MEDICINE

## 2025-07-30 PROCEDURE — 1159F MED LIST DOCD IN RCRD: CPT | Mod: CPTII,S$GLB,, | Performed by: INTERNAL MEDICINE

## 2025-07-30 NOTE — PROGRESS NOTES
Subjective:    Patient ID:  Palmer Guerra is a 60 y.o. male who presents for Establish Care and Heart Problem        HPI  NEW PATIENT EVALUATION WAS FOLLOWED BY DR. MCKEON  HISTORY OF HYPERTENSION CVA 2020, ECHO IN 2021 NORMAL LV FUNCTION NO SHUNT, RECENT LABS TSH AND CMP NORMAL, RECENT MRI OF THE BRAIN  TO GET SSI, CAN'T WORK DRIVE, LEWI BODY DEMENTIA, CHRONIC RIGHT MCA CHANGE IT INFARCT SMALL VESSEL ISCHEMIC CHANGES, STILL SMOKES, TO HAVE BACK INJECTIONS, HAS LOOP RECORDER, SEE ROS    Past Medical History:   Diagnosis Date    CVA (cerebral vascular accident) 2/2/2021    Depression     ED (erectile dysfunction)     Hypertension     Stroke     2/2/20     Past Surgical History:   Procedure Laterality Date    TONSILLECTOMY       Family History   Problem Relation Name Age of Onset    Hypertension Mother      Macular degeneration Mother      Hypertension Maternal Grandmother      Hearing loss Maternal Grandmother      Cataracts Maternal Grandmother      Glaucoma Neg Hx      Retinal detachment Neg Hx       Social History     Socioeconomic History    Marital status:    Tobacco Use    Smoking status: Every Day     Current packs/day: 1.00     Average packs/day: 1 pack/day for 42.0 years (42.0 ttl pk-yrs)     Types: Cigarettes    Smokeless tobacco: Never   Substance and Sexual Activity    Alcohol use: Not Currently     Comment: 12-20 cans/day beer    Drug use: No     Social Drivers of Health     Financial Resource Strain: Medium Risk (2/23/2024)    Overall Financial Resource Strain (CARDIA)     Difficulty of Paying Living Expenses: Somewhat hard   Food Insecurity: Food Insecurity Present (2/23/2024)    Hunger Vital Sign     Worried About Running Out of Food in the Last Year: Often true     Ran Out of Food in the Last Year: Never true   Transportation Needs: No Transportation Needs (2/23/2024)    PRAPARE - Transportation     Lack of Transportation (Medical): No     Lack of Transportation (Non-Medical): No    Physical Activity: Inactive (2/23/2024)    Exercise Vital Sign     Days of Exercise per Week: 0 days     Minutes of Exercise per Session: 0 min   Stress: No Stress Concern Present (2/23/2024)    [employer] [place] [employer] - Occupational Stress Questionnaire     Feeling of Stress : Only a little   Housing Stability: Low Risk  (2/23/2024)    Housing Stability Vital Sign     Unable to Pay for Housing in the Last Year: No     Number of Places Lived in the Last Year: 1     Unstable Housing in the Last Year: No       Review of patient's allergies indicates:  No Known Allergies  Current Medications[1]    Review of Systems   Constitutional: Negative for chills, diaphoresis, malaise/fatigue and night sweats.   HENT:  Negative for congestion, hearing loss and nosebleeds.    Eyes:  Negative for blurred vision and visual disturbance.   Cardiovascular:  Negative for chest pain, claudication, cyanosis, dyspnea on exertion, irregular heartbeat, leg swelling, near-syncope, orthopnea, palpitations, paroxysmal nocturnal dyspnea and syncope.   Respiratory:  Positive for cough. Negative for hemoptysis, shortness of breath, sputum production and wheezing.    Endocrine: Negative for cold intolerance, heat intolerance and polyuria.   Hematologic/Lymphatic: Negative for adenopathy. Does not bruise/bleed easily.   Skin:  Negative for color change, itching and nail changes.   Musculoskeletal:  Positive for back pain (SPINAL STENOSIS). Negative for falls and joint pain.   Gastrointestinal:  Negative for abdominal pain, dysphagia, heartburn, hematemesis, jaundice, melena and vomiting.   Genitourinary:  Negative for dysuria and flank pain.   Neurological:  Positive for difficulty with concentration and loss of balance. Negative for brief paralysis, dizziness, focal weakness, light-headedness, numbness, paresthesias and tremors.   Psychiatric/Behavioral:  Positive for memory loss and substance abuse. Negative for depression.   "  Allergic/Immunologic: Negative for persistent infections.        Objective:      Vitals:    07/30/25 1556   BP: (!) 126/59   Pulse: 79   Weight: 72.9 kg (160 lb 11.5 oz)   Height: 5' 9" (1.753 m)   PainSc: 0-No pain     Body mass index is 23.73 kg/m².    Physical Exam  Constitutional:       General: He is not in acute distress.     Appearance: He is well-developed. He is not diaphoretic.   HENT:      Head: Normocephalic and atraumatic.   Eyes:      General: No scleral icterus.     Conjunctiva/sclera: Conjunctivae normal.   Cardiovascular:      Rate and Rhythm: Normal rate and regular rhythm. No extrasystoles are present.     Pulses:           Carotid pulses are 2+ on the right side and 2+ on the left side.       Radial pulses are 2+ on the right side and 2+ on the left side.      Heart sounds: Murmur heard.      Systolic murmur is present with a grade of 1/6 at the lower left sternal border.      No friction rub. No gallop.   Pulmonary:      Effort: No respiratory distress.      Breath sounds: No stridor. Rhonchi present.   Musculoskeletal:         General: No signs of injury.      Cervical back: Normal range of motion.   Skin:     Coloration: Skin is not jaundiced.   Neurological:      Mental Status: He is alert. Mental status is at baseline.   Psychiatric:         Mood and Affect: Mood normal.         Behavior: Behavior normal.               ..    Chemistry        Component Value Date/Time     03/31/2025 1024     08/09/2023 1551    K 4.1 03/31/2025 1024    K 3.9 08/09/2023 1551     03/31/2025 1024     08/09/2023 1551    CO2 27 03/31/2025 1024    CO2 27 08/09/2023 1551    BUN 6 03/31/2025 1024    CREATININE 1.0 03/31/2025 1024     03/31/2025 1024    GLU 94 08/09/2023 1551        Component Value Date/Time    CALCIUM 9.7 03/31/2025 1024    CALCIUM 9.8 08/09/2023 1551    ALKPHOS 74 03/31/2025 1024    ALKPHOS 87 08/09/2023 1551    AST 21 03/31/2025 1024    AST 22 08/09/2023 1551    ALT " 22 03/31/2025 1024    ALT 20 08/09/2023 1551    BILITOT 0.3 03/31/2025 1024    BILITOT 0.4 08/09/2023 1551    ESTGFRAFRICA >60 06/22/2022 1512    EGFRNONAA >60 06/22/2022 1512            ..  Lab Results   Component Value Date    CHOL 134 05/28/2024    CHOL 168 03/03/2023    CHOL 116 03/29/2022     Lab Results   Component Value Date    HDL 50 05/28/2024    HDL 45 03/03/2023    HDL 60 03/29/2022     Lab Results   Component Value Date    LDLCALC 50 05/28/2024    LDLCALC 87 03/03/2023    LDLCALC 33 03/29/2022     Lab Results   Component Value Date    TRIG 210 (H) 05/28/2024    TRIG 213 (H) 03/03/2023    TRIG 134 03/29/2022     Lab Results   Component Value Date    CHOLHDL 38.0 02/03/2021    CHOLHDL 42.9 01/28/2021    CHOLHDL 47.3 01/05/2009     ..  Lab Results   Component Value Date    WBC 9.46 03/31/2025    HGB 13.6 (L) 03/31/2025    HCT 42.7 03/31/2025    MCV 93 03/31/2025     03/31/2025       Test(s) Reviewed  I have reviewed the following in detail:  [] Stress test   [] Angiography   [x] Echocardiogram   [x] Labs   [x] Other:       Assessment:         ICD-10-CM ICD-9-CM   1. Essential hypertension  I10 401.9   2. Mixed hyperlipidemia  E78.2 272.2   3. Nonspecific abnormal electrocardiogram (ECG) (EKG)  R94.31 794.31   4. Tobacco abuse  Z72.0 305.1   5. 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 Status post placement of implantable loop recorder  Z95.818 V45.09   7. Long term (current) use of antithrombotics/antiplatelets  Z79.02 V58.63   8. History of stroke  Z86.73 V12.54   9. Systolic murmur  R01.1 785.2     Problem List Items Addressed This Visit          Neuro    History of stroke    Overview    Jan 2021  R MCA-PCA watershed ischemia r/t R proximal MCA occlusion   Clinical LUE weakness, L hemianopsia               Relevant Orders    Echo       Cardiac/Vascular    Essential hypertension - Primary    Relevant Orders    IN OFFICE EKG 12-LEAD (to [place])    Echo    Mixed  hyperlipidemia    Relevant Orders    IN OFFICE EKG 12-LEAD (to [place])       Other    Tobacco abuse     Other Visit Diagnoses         Nonspecific abnormal electrocardiogram (ECG) (EKG)        Relevant Orders    Echo      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          Status post placement of implantable loop recorder          Long term (current) use of antithrombotics/antiplatelets          Systolic murmur        Relevant Orders    Echo             Plan:     EKG SINUS RHYTHM, WILL NEED FURTHER EVALUATION CHECK ECHO, TOBACCO CESSATION EXTENSIVE COUNSELING DISCUSSED AT LENGTH WITH THE PATIENT AND HIS WIFE SAY PLANNED POSSIBLY RELATED TO STROKE ETCETERA    ALL OTHER CV CLINICALLY STABLE, NO ANGINA, NO HF, NO TIA, NO CLINICAL ARRHYTHMIA,CONTINUE CURRENT MEDS, EDUCATION, DIET, EXERCISE         Essential hypertension  -     IN OFFICE EKG 12-LEAD (to [place])  -     Echo    Mixed hyperlipidemia  -     IN OFFICE EKG 12-LEAD (to [place])    Nonspecific abnormal electrocardiogram (ECG) (EKG)  -     Echo    Tobacco abuse    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 Heart healthy diet and risk factor modification.    See labs and med orders.    Aerobic exercise 5x's/wk. Heart healthy diet and risk factor modification.    See labs and med orders.             [1]   Current Outpatient Medications:     amLODIPine (NORVASC) 10 MG tablet, Take 1 tablet (10 mg total) by mouth once daily., Disp: 30 tablet, Rfl: 3    aspirin (ECOTRIN) 81 MG EC tablet, Take 1 tablet (81 mg total) by mouth once daily., Disp: 30 tablet, Rfl: 3    atorvastatin (LIPITOR) 40 MG tablet, TAKE 1 TABLET BY MOUTH ONCE DAILY IN THE EVENING, Disp: 90 tablet, Rfl: 1    busPIRone (BUSPAR) 15 MG tablet, Take 1  tablet by mouth three times daily as needed, Disp: 40 tablet, Rfl: 0    cetirizine (ZYRTEC) 10 MG tablet, Take 10 mg by mouth once daily., Disp: , Rfl:     clonazePAM (KLONOPIN) 0.5 MG tablet, Take 0.25 mg by mouth every evening., Disp: , Rfl:     clopidogreL (PLAVIX) 75 mg tablet, Take 1 tablet by mouth once daily., Disp: , Rfl:     cyanocobalamin, vitamin B-12, 5,000 mcg Subl, Place one under tongue once a day for 1 month, then continue to take under tongue per week, Disp: 30 tablet, Rfl: 3    montelukast (SINGULAIR) 10 mg tablet, Take 1 tablet by mouth every evening., Disp: , Rfl:     multivit-min-folic-vit K-lycop (ONE-A-DAY MEN'S 50 PLUS) 400- mcg Tab, Take 1 tablet by mouth once daily., Disp: , Rfl:     NAMZARIC 7-10 mg CSpX, Take 1 capsule by mouth every morning., Disp: , Rfl:     traZODone (DESYREL) 50 MG tablet, Take 1 tablet (50 mg total) by mouth every evening., Disp: 30 tablet, Rfl: 2    triamcinolone acetonide 0.1% (KENALOG) 0.1 % cream, Apply topically 2 (two) times daily., Disp: , Rfl:     donepeziL (ARICEPT) 5 MG tablet, Take 1 tablet (5 mg) orally once a day in the AM for 4 weeks. On Week 4, switch to 10 mg prescription and take 1 tablet (10mg) orally once a day in the AM (Patient not taking: Reported on 7/30/2025), Disp: 30 tablet, Rfl: 0    sildenafiL (VIAGRA) 100 MG tablet, Take 100 mg by mouth daily as needed for Erectile Dysfunction. (Patient not taking: Reported on 7/30/2025), Disp: , Rfl:   No current facility-administered medications for this visit.    Facility-Administered Medications Ordered in Other Visits:     neomycin-bacitracin-polymyxin packet, , , PRN, Erich Larson MD, 1 packet at 04/10/14 0913

## 2025-07-31 PROBLEM — F01.A0 MILD VASCULAR DEMENTIA: Chronic | Status: ACTIVE | Noted: 2025-07-31

## 2025-07-31 PROBLEM — Z95.818 STATUS POST PLACEMENT OF IMPLANTABLE LOOP RECORDER: Status: ACTIVE | Noted: 2025-07-31

## 2025-07-31 PROBLEM — R01.1 SYSTOLIC MURMUR: Status: ACTIVE | Noted: 2025-07-31

## 2025-07-31 PROBLEM — Z79.02 LONG TERM (CURRENT) USE OF ANTITHROMBOTICS/ANTIPLATELETS: Status: ACTIVE | Noted: 2025-07-31

## 2025-08-05 ENCOUNTER — TELEPHONE (OUTPATIENT)
Facility: CLINIC | Age: 60
End: 2025-08-05
Payer: MEDICAID

## 2025-08-05 NOTE — TELEPHONE ENCOUNTER
Called and spoke with Pts spouse in regards to scheduling a vv with Dr Johnston to discuss MRI results, Pt is now scheduled at next available 9/2 with Dr Johnston.

## 2025-08-07 ENCOUNTER — CLINICAL SUPPORT (OUTPATIENT)
Dept: CARDIOLOGY | Facility: HOSPITAL | Age: 60
End: 2025-08-07
Payer: MEDICAID

## 2025-08-07 ENCOUNTER — HOSPITAL ENCOUNTER (OUTPATIENT)
Dept: CARDIOLOGY | Facility: HOSPITAL | Age: 60
Discharge: HOME | End: 2025-08-07
Attending: INTERNAL MEDICINE
Payer: MEDICAID

## 2025-08-07 DIAGNOSIS — I63.9 CEREBRAL INFARCTION, UNSPECIFIED: ICD-10-CM

## 2025-08-07 PROCEDURE — 93298 REM INTERROG DEV EVAL SCRMS: CPT | Mod: PO | Performed by: INTERNAL MEDICINE

## 2025-08-07 PROCEDURE — 93298 REM INTERROG DEV EVAL SCRMS: CPT | Mod: 26,,, | Performed by: INTERNAL MEDICINE

## 2025-08-25 LAB
OHS CV AF BURDEN PERCENT: < 1
OHS CV DC REMOTE DEVICE TYPE: NORMAL

## 2025-09-02 ENCOUNTER — OFFICE VISIT (OUTPATIENT)
Facility: CLINIC | Age: 60
End: 2025-09-02
Payer: MEDICAID

## 2025-09-02 DIAGNOSIS — F01.50: Primary | ICD-10-CM

## 2025-09-02 DIAGNOSIS — G89.29 OTHER CHRONIC PAIN: ICD-10-CM

## 2025-09-02 DIAGNOSIS — I69.319 COGNITIVE DEFICIT AS LATE EFFECT OF CEREBROVASCULAR ACCIDENT (CVA): ICD-10-CM

## 2025-09-02 DIAGNOSIS — F03.90 MAJOR NEUROCOGNITIVE DISORDER: ICD-10-CM

## 2025-09-02 DIAGNOSIS — G31.89 SYNUCLEINOPATHY: ICD-10-CM

## 2025-09-02 DIAGNOSIS — R45.1 AGITATION: ICD-10-CM

## 2025-09-02 RX ORDER — CLONAZEPAM 2 MG/1
0.25 TABLET ORAL 2 TIMES DAILY
COMMUNITY

## 2025-09-02 RX ORDER — QUETIAPINE FUMARATE 25 MG/1
25 TABLET, FILM COATED ORAL 3 TIMES DAILY PRN
COMMUNITY

## 2025-09-02 RX ORDER — GABAPENTIN 300 MG/1
300 CAPSULE ORAL 3 TIMES DAILY
COMMUNITY